# Patient Record
Sex: FEMALE | Race: WHITE | Employment: FULL TIME | URBAN - NONMETROPOLITAN AREA
[De-identification: names, ages, dates, MRNs, and addresses within clinical notes are randomized per-mention and may not be internally consistent; named-entity substitution may affect disease eponyms.]

---

## 2017-02-03 LAB — HBA1C MFR BLD: 6 %

## 2017-05-12 DIAGNOSIS — I10 ESSENTIAL HYPERTENSION: ICD-10-CM

## 2017-05-12 RX ORDER — LOSARTAN POTASSIUM AND HYDROCHLOROTHIAZIDE 12.5; 5 MG/1; MG/1
TABLET ORAL
Qty: 90 TABLET | Refills: 3 | Status: CANCELLED | OUTPATIENT
Start: 2017-05-12

## 2017-05-12 RX ORDER — LOSARTAN POTASSIUM AND HYDROCHLOROTHIAZIDE 12.5; 5 MG/1; MG/1
TABLET ORAL
Qty: 30 TABLET | Refills: 0 | Status: SHIPPED | OUTPATIENT
Start: 2017-05-12 | End: 2017-05-22 | Stop reason: SDUPTHER

## 2017-05-22 ENCOUNTER — OFFICE VISIT (OUTPATIENT)
Dept: FAMILY MEDICINE CLINIC | Age: 62
End: 2017-05-22

## 2017-05-22 VITALS
HEART RATE: 54 BPM | DIASTOLIC BLOOD PRESSURE: 70 MMHG | RESPIRATION RATE: 12 BRPM | HEIGHT: 62 IN | WEIGHT: 142.8 LBS | SYSTOLIC BLOOD PRESSURE: 104 MMHG | BODY MASS INDEX: 26.28 KG/M2

## 2017-05-22 DIAGNOSIS — R73.01 IFG (IMPAIRED FASTING GLUCOSE): ICD-10-CM

## 2017-05-22 DIAGNOSIS — Z72.0 TOBACCO ABUSE: ICD-10-CM

## 2017-05-22 DIAGNOSIS — I10 ESSENTIAL HYPERTENSION: Primary | ICD-10-CM

## 2017-05-22 DIAGNOSIS — Z12.31 ENCOUNTER FOR SCREENING MAMMOGRAM FOR MALIGNANT NEOPLASM OF BREAST: ICD-10-CM

## 2017-05-22 PROCEDURE — 99214 OFFICE O/P EST MOD 30 MIN: CPT | Performed by: FAMILY MEDICINE

## 2017-05-22 RX ORDER — LOSARTAN POTASSIUM AND HYDROCHLOROTHIAZIDE 12.5; 5 MG/1; MG/1
TABLET ORAL
Qty: 90 TABLET | Refills: 3 | Status: SHIPPED | OUTPATIENT
Start: 2017-05-22 | End: 2018-05-14 | Stop reason: SDUPTHER

## 2017-05-22 ASSESSMENT — ENCOUNTER SYMPTOMS
ABDOMINAL PAIN: 0
VOMITING: 0
DIARRHEA: 0
SHORTNESS OF BREATH: 0
BLOOD IN STOOL: 0
NAUSEA: 0
EYES NEGATIVE: 1

## 2017-05-22 ASSESSMENT — PATIENT HEALTH QUESTIONNAIRE - PHQ9
SUM OF ALL RESPONSES TO PHQ9 QUESTIONS 1 & 2: 0
2. FEELING DOWN, DEPRESSED OR HOPELESS: 0
1. LITTLE INTEREST OR PLEASURE IN DOING THINGS: 0
SUM OF ALL RESPONSES TO PHQ QUESTIONS 1-9: 0

## 2017-12-28 ENCOUNTER — OFFICE VISIT (OUTPATIENT)
Dept: FAMILY MEDICINE CLINIC | Age: 62
End: 2017-12-28
Payer: COMMERCIAL

## 2017-12-28 VITALS
BODY MASS INDEX: 25.58 KG/M2 | HEART RATE: 100 BPM | SYSTOLIC BLOOD PRESSURE: 140 MMHG | RESPIRATION RATE: 16 BRPM | WEIGHT: 141 LBS | DIASTOLIC BLOOD PRESSURE: 64 MMHG | OXYGEN SATURATION: 98 % | TEMPERATURE: 98 F

## 2017-12-28 DIAGNOSIS — J20.9 ACUTE BRONCHITIS WITH BRONCHOSPASM: Primary | ICD-10-CM

## 2017-12-28 DIAGNOSIS — Z72.0 TOBACCO ABUSE: ICD-10-CM

## 2017-12-28 DIAGNOSIS — R05.9 COUGH: ICD-10-CM

## 2017-12-28 DIAGNOSIS — R49.0 VOICE HOARSENESS: ICD-10-CM

## 2017-12-28 PROCEDURE — 94640 AIRWAY INHALATION TREATMENT: CPT | Performed by: NURSE PRACTITIONER

## 2017-12-28 PROCEDURE — 99214 OFFICE O/P EST MOD 30 MIN: CPT | Performed by: NURSE PRACTITIONER

## 2017-12-28 RX ORDER — CEFDINIR 300 MG/1
300 CAPSULE ORAL EVERY 12 HOURS
Qty: 20 CAPSULE | Refills: 0 | Status: SHIPPED | OUTPATIENT
Start: 2017-12-28 | End: 2018-01-07

## 2017-12-28 RX ORDER — ALBUTEROL SULFATE 2.5 MG/3ML
2.5 SOLUTION RESPIRATORY (INHALATION) ONCE
Status: COMPLETED | OUTPATIENT
Start: 2017-12-28 | End: 2017-12-28

## 2017-12-28 RX ORDER — ALBUTEROL SULFATE 90 UG/1
2 AEROSOL, METERED RESPIRATORY (INHALATION) EVERY 6 HOURS PRN
Qty: 1 INHALER | Refills: 0 | Status: SHIPPED | OUTPATIENT
Start: 2017-12-28 | End: 2018-09-18 | Stop reason: ALTCHOICE

## 2017-12-28 RX ORDER — BENZONATATE 100 MG/1
100 CAPSULE ORAL 3 TIMES DAILY PRN
Qty: 21 CAPSULE | Refills: 0 | Status: SHIPPED | OUTPATIENT
Start: 2017-12-28 | End: 2018-01-04

## 2017-12-28 RX ADMIN — ALBUTEROL SULFATE 2.5 MG: 2.5 SOLUTION RESPIRATORY (INHALATION) at 16:02

## 2017-12-28 ASSESSMENT — ENCOUNTER SYMPTOMS
ANAL BLEEDING: 0
RHINORRHEA: 0
SHORTNESS OF BREATH: 1
PHOTOPHOBIA: 0
VOICE CHANGE: 1
DIARRHEA: 0
BACK PAIN: 1
COUGH: 1
BLOOD IN STOOL: 0
SINUS PAIN: 0
WHEEZING: 1
VOMITING: 0
SORE THROAT: 1
ABDOMINAL PAIN: 0
ABDOMINAL DISTENTION: 0
NAUSEA: 0
CONSTIPATION: 0
SINUS PRESSURE: 0
TROUBLE SWALLOWING: 0
CHEST TIGHTNESS: 1
APNEA: 0

## 2017-12-28 NOTE — PATIENT INSTRUCTIONS
Orders Placed:  Orders Placed This Encounter   Procedures    [id number] - ID INHAL RX, AIRWAY OBST/DX SPUTUM INDUCT     Medications Prescribed:  Orders Placed This Encounter   Medications    cefdinir (OMNICEF) 300 MG capsule     Sig: Take 1 capsule by mouth every 12 hours for 10 days     Dispense:  20 capsule     Refill:  0    albuterol sulfate  (90 Base) MCG/ACT inhaler     Sig: Inhale 2 puffs into the lungs every 6 hours as needed for Wheezing     Dispense:  1 Inhaler     Refill:  0    albuterol (PROVENTIL) nebulizer solution 2.5 mg    benzonatate (TESSALON PERLES) 100 MG capsule     Sig: Take 1 capsule by mouth 3 times daily as needed for Cough     Dispense:  21 capsule     Refill:  0       Take medications as prescribed. Take a probiotic (such as the store brand that is comparable to Align, Culturelle or Florastor) daily while you are taking the antibiotic. Separate the probiotic and the antibiotic by at least 2 hours. Continue taking the probiotic for 2 weeks after completing the antibiotic. Do NOT take probiotics if you are immunocompromised (i.e., on chemotherapy or taking immunosuppressive drugs following a transplant). Be aware that some probiotics contain gluten. If you are intolerant of gluten, check with the pharmacist before your purchase of probiotics. Push fluids. Avoid acidic foods and beverages. Ibuprofen or Acetaminophen as directed for discomfort or fever. Take Ibuprofen with food. Cool mist humidifier at the bedside. Warm saline gargles every 2 hours as needed for sore throat. Mix 1 teaspoonful of salt with 8 ounces of water to make a saline solution. Be sure to get plenty of rest.    Call with any concerns. Call the office if symptoms worsen or fail to improve. Follow up as needed. She has been advised to call the office if she wants to proceed with a screening CT of the lungs as advised.     An Albuterol 2.5 mg nebulizer was provided in the office stop-smoking programs and medicines. These can increase your chances of quitting for good. When should you call for help? Call 911 anytime you think you may need emergency care. For example, call if:  ? · You have severe trouble breathing. ?Call your doctor now or seek immediate medical care if:  ? · You have new or worse trouble breathing. ? · You cough up dark brown or bloody mucus (sputum). ? · You have a new or higher fever. ? · You have a new rash. ? Watch closely for changes in your health, and be sure to contact your doctor if:  ? · You cough more deeply or more often, especially if you notice more mucus or a change in the color of your mucus. ? · You are not getting better as expected. Where can you learn more? Go to [email or web address]://Go World!.[email or web address] org and sign in to your ClosetDash account. Enter H333 in the [employer] box to learn more about \"Bronchitis: Care Instructions. \"     If you do not have an account, please click on the \"Sign Up Now\" link. Current as of: May 12, 2017  Content Version: 11.4  © 4658-7738 Hang [employer]/. Care instructions adapted under license by [facility] ([facility]). If you have questions about a medical condition or this instruction, always ask your healthcare professional. [address] any warranty or liability for your use of this information. Patient Education        Stopping Smoking: Care Instructions  Your Care Instructions    Cigarette smokers crave the nicotine in cigarettes. Giving it up is much harder than simply changing a habit. Your body has to stop craving the nicotine. It is hard to quit, but you can do it. There are many tools that people use to quit smoking. You may find that combining tools works best for you. There are several steps to quitting. First you get ready to quit. Then you get support to help you. After that, you learn new skills and behaviors to become a nonsmoker.  For many people, a that put you at greatest risk for smoking. For some people, it is hard to have a drink with friends without smoking. For others, they might skip a coffee break with coworkers who smoke. ¨ Change your daily routine. Take a different route to work or eat a meal in a different place. · Cut down on stress. Calm yourself or release tension by doing an activity you enjoy, such as reading a book, taking a hot bath, or gardening. · Talk to your doctor or pharmacist about nicotine replacement therapy, which replaces the nicotine in your body. You still get nicotine but you do not use tobacco. Nicotine replacement products help you slowly reduce the amount of nicotine you need. These products come in several forms, many of them available over-the-counter:  ¨ Nicotine patches  ¨ Nicotine gum and lozenges  ¨ Nicotine inhaler  · Ask your doctor about bupropion (Wellbutrin) or varenicline (Chantix), which are prescription medicines. They do not contain nicotine. They help you by reducing withdrawal symptoms, such as stress and anxiety. · Some people find hypnosis, acupuncture, and massage helpful for ending the smoking habit. · Eat a healthy diet and get regular exercise. Having healthy habits will help your body move past its craving for nicotine. · Be prepared to keep trying. Most people are not successful the first few times they try to quit. Do not get mad at yourself if you smoke again. Make a list of things you learned and think about when you want to try again, such as next week, next month, or next year. Where can you learn more? Go to https://mallory.Vinja. org and sign in to your JumpTheClub account. Enter P699 in the [facility] box to learn more about \"Stopping Smoking: Care Instructions. \"     If you do not have an account, please click on the \"Sign Up Now\" link. Current as of: March 20, 2017  Content Version: 11.4  © 5899-4536 Healthwise, Incorporated.  Care instructions adapted under license by [facility] ([facility]). If you have questions about a medical condition or this instruction, always ask your healthcare professional. [place] [address] any warranty or liability for your use of this information.

## 2017-12-28 NOTE — PROGRESS NOTES
Audrey Feng is a 63 y. o.female is here today for:  Chief Complaint   Patient presents with    Cough     sx's for 2 week. hoarse voice, cough. sore throat, chest pain with cough. works at [employer] and NP gave her Z-max x3days. used mucinex/tylenol cold&flu/aleve. also tried Tessalon. The patient is here today with a complaint of a cough, sore throat, voice hoarseness and dyspnea. The patient has been afebrile. She has wheezing. She has a 40 pack year smoking history. The patient is expectorating yellowish-green mucus. She denies hemoptysis. She states she took 3 doses of azithromycin (prescribed by a provider in her work environment at [employer]) without improvement. She completed the medication on 12/21/17. Duration of symptoms:  2 weeks    Review of Systems   Constitutional: Positive for chills and fatigue. Negative for activity change, appetite change, diaphoresis, fever and unexpected weight change. HENT: Positive for sore throat and voice change. Negative for dental problem, ear discharge, ear pain, hearing loss, nosebleeds, postnasal drip, rhinorrhea, sinus pain, sinus pressure, sneezing, tinnitus and trouble swallowing. Eyes: Negative for photophobia and visual disturbance. Respiratory: Positive for cough, chest tightness, shortness of breath and wheezing. Negative for apnea. Cardiovascular: Negative for chest pain, palpitations and leg swelling. Gastrointestinal: Negative for abdominal distention, abdominal pain, anal bleeding, blood in stool, constipation, diarrhea, nausea and vomiting. Genitourinary: Negative for difficulty urinating, dysuria, flank pain, frequency, hematuria, pelvic pain, urgency, vaginal bleeding and vaginal discharge. Musculoskeletal: Positive for arthralgias, back pain and myalgias. Negative for joint swelling and neck stiffness. Skin: Negative for rash and wound. Allergic/Immunologic: Negative for immunocompromised state. Neurological: Positive for headaches. Negative for dizziness, tremors, seizures, syncope and weakness. Hematological: Negative for adenopathy. Does not bruise/bleed easily. Psychiatric/Behavioral: Negative for decreased concentration, dysphoric mood, sleep disturbance and suicidal ideas. The patient is not nervous/anxious. OBJECTIVE     BP (!) 140/64   Pulse 100   Temp 98 °F (36.7 °C) (Oral)   Resp 16   Wt 141 lb (64 kg)   SpO2 98%   BMI 25.58 kg/m²     Body mass index is 25.58 kg/m². Physical Exam   Constitutional: She is oriented to person, place, and time. She appears well-developed and well-nourished. HENT:   Head: Normocephalic and atraumatic. Right Ear: External ear normal.   Left Ear: External ear normal.   Mouth/Throat: Oropharynx is clear and moist. No oropharyngeal exudate. Posterior pharynx erythematous. Nasal turbinates erythematous and edematous. Eyes: Conjunctivae and EOM are normal. Pupils are equal, round, and reactive to light. Right eye exhibits no discharge. Left eye exhibits no discharge. No scleral icterus. Neck: Normal range of motion. Neck supple. No JVD present. No tracheal deviation present. No thyromegaly present. Cardiovascular: Normal rate, regular rhythm and normal heart sounds. Exam reveals no gallop and no friction rub. No murmur heard. Pulmonary/Chest: Effort normal. No respiratory distress. She exhibits no tenderness. Rhonchi (worse left base). Abdominal: Soft. Bowel sounds are normal. She exhibits no distension and no mass. There is no tenderness. Musculoskeletal: Normal range of motion. She exhibits no edema or tenderness. No CVA tenderness to percussion. Lymphadenopathy:     She has no cervical adenopathy. Neurological: She is alert and oriented to person, place, and time. Coordination normal.   Skin: Skin is warm and dry. No rash noted. Psychiatric: She has a normal mood and affect.  Her behavior is normal. Thought content for discomfort or fever. Take Ibuprofen with food. Cool mist humidifier at the bedside. Warm saline gargles every 2 hours as needed for sore throat. Mix 1 teaspoonful of salt with 8 ounces of water to make a saline solution. Be sure to get plenty of rest.    Call with any concerns. Call the office if symptoms worsen or fail to improve. Follow up as needed. She has been advised to call the office if she desires to proceed with a screening CT of the lungs as advised. An Albuterol 2.5 mg nebulizer was provided in the office today. Smoking cessation is advised. Return if symptoms worsen or fail to improve.        Electronically signed by Teddy Fitzgerald CNP on 12/28/2017 at 4:37 PM

## 2017-12-29 DIAGNOSIS — R05.9 COUGH: ICD-10-CM

## 2017-12-29 RX ORDER — BENZONATATE 100 MG/1
CAPSULE ORAL
Qty: 21 CAPSULE | Refills: 0 | OUTPATIENT
Start: 2017-12-29

## 2017-12-29 NOTE — TELEPHONE ENCOUNTER
This is regarding a medication refill request from [facility] for Tessalon 100mg 1 capsule TID PRN  Last written:12/28/2017 21 capsules with 0 refills- sent to same pharmacy  Rx refused.

## 2018-03-15 ENCOUNTER — OFFICE VISIT (OUTPATIENT)
Dept: FAMILY MEDICINE CLINIC | Age: 63
End: 2018-03-15
Payer: COMMERCIAL

## 2018-03-15 VITALS
WEIGHT: 143.2 LBS | RESPIRATION RATE: 16 BRPM | TEMPERATURE: 98.4 F | HEART RATE: 96 BPM | DIASTOLIC BLOOD PRESSURE: 64 MMHG | BODY MASS INDEX: 26.35 KG/M2 | HEIGHT: 62 IN | SYSTOLIC BLOOD PRESSURE: 132 MMHG

## 2018-03-15 DIAGNOSIS — J20.9 ACUTE BRONCHITIS DUE TO INFECTION: Primary | ICD-10-CM

## 2018-03-15 DIAGNOSIS — J01.00 ACUTE MAXILLARY SINUSITIS, RECURRENCE NOT SPECIFIED: ICD-10-CM

## 2018-03-15 PROCEDURE — 99213 OFFICE O/P EST LOW 20 MIN: CPT | Performed by: NURSE PRACTITIONER

## 2018-03-15 RX ORDER — BENZONATATE 100 MG/1
100 CAPSULE ORAL 3 TIMES DAILY PRN
Qty: 21 CAPSULE | Refills: 0 | Status: SHIPPED | OUTPATIENT
Start: 2018-03-15 | End: 2018-03-22

## 2018-03-15 RX ORDER — AZITHROMYCIN 250 MG/1
TABLET, FILM COATED ORAL
Qty: 6 TABLET | Refills: 0 | Status: SHIPPED | OUTPATIENT
Start: 2018-03-15 | End: 2018-09-18 | Stop reason: ALTCHOICE

## 2018-03-15 RX ORDER — PREDNISONE 20 MG/1
20 TABLET ORAL 2 TIMES DAILY
Qty: 10 TABLET | Refills: 0 | Status: SHIPPED | OUTPATIENT
Start: 2018-03-15 | End: 2018-03-20

## 2018-03-15 ASSESSMENT — ENCOUNTER SYMPTOMS
EYE REDNESS: 0
RHINORRHEA: 1
HOARSE VOICE: 1
SINUS PRESSURE: 1
WHEEZING: 1
COUGH: 1
EYE ITCHING: 0
SHORTNESS OF BREATH: 1
EYE PAIN: 0
ABDOMINAL PAIN: 0
DIARRHEA: 0
EYE DISCHARGE: 0
BACK PAIN: 0
CHEST TIGHTNESS: 0
TROUBLE SWALLOWING: 0
VOMITING: 0
NAUSEA: 0
SORE THROAT: 1
CONSTIPATION: 0

## 2018-03-15 NOTE — PROGRESS NOTES
[address] [facility]  [address] [place] [address] Rd[phone] [id number]  Dept: [phone]  Dept Fax: [phone]  Loc: [phone]      Shanique Fulton is a 63 y.o. female who presents today for Cough (here today for cough, chills and sweats, sinus pressure, facial pressure, everything hurts x 4 days. Tried Ibuprofen 800mg- not helping, cough drops)      HPI:   Presents for cough, chills and PND. + smoking history  Sinusitis   This is a new problem. The current episode started in the past 7 days. The problem is unchanged. The maximum temperature recorded prior to her arrival was 101 - 101.9 F. She is experiencing no pain. Associated symptoms include chills, congestion, coughing (productive cough yellow sputum), headaches, a hoarse voice, shortness of breath, sinus pressure and a sore throat. Pertinent negatives include no ear pain. The patient is allergic to vicodin [hydrocodone-acetaminophen]. Past Medical History  Zulema  has a past medical history of Hypertension; Rheumatic fever; and Tobacco abuse. Medications    Current Outpatient Prescriptions:     predniSONE (DELTASONE) 20 MG tablet, Take 1 tablet by mouth 2 times daily for 5 days, Disp: 10 tablet, Rfl: 0    azithromycin (ZITHROMAX Z-JOSE L) 250 MG tablet, 2 tablets day 1 then1 tablet days 2 - 5., Disp: 6 tablet, Rfl: 0    benzonatate (TESSALON PERLES) 100 MG capsule, Take 1 capsule by mouth 3 times daily as needed for Cough, Disp: 21 capsule, Rfl: 0    albuterol sulfate  (90 Base) MCG/ACT inhaler, Inhale 2 puffs into the lungs every 6 hours as needed for Wheezing, Disp: 1 Inhaler, Rfl: 0    losartan-hydrochlorothiazide (HYZAAR) 50-12.5 MG per tablet, TAKE 1 TABLET BY MOUTH ONE TIME A DAY, Disp: 90 tablet, Rfl: 3    acetaminophen 650 MG TABS, Take 650 mg by mouth every 4 hours as needed (Fever >100.5 (38 C)). , Disp: 120 tablet, Rfl:     aspirin 81 MG chewable tablet, Take 1 tablet by mouth daily. , Disp: 30 oropharyngeal edema or tonsillar abscesses. Eyes: Conjunctivae and EOM are normal. Pupils are equal, round, and reactive to light. Neck: Normal range of motion. Neck supple. No JVD present. No tracheal deviation present. No thyromegaly present. Cardiovascular: Normal rate, regular rhythm and normal heart sounds. Exam reveals no gallop and no friction rub. No murmur heard. Pulmonary/Chest: Effort normal. No stridor. No respiratory distress. She has wheezes. She has no rales. She exhibits no tenderness. I/E wheezing   Lymphadenopathy:     She has cervical adenopathy. Neurological: She is alert and oriented to person, place, and time. Skin: Skin is warm and dry. Psychiatric: She has a normal mood and affect. Her behavior is normal. Judgment normal.   Nursing note and vitals reviewed. Assessment/Plan:      Valdemar Figueroa was seen today for cough. Diagnoses and all orders for this visit:    Acute bronchitis due to infection  -     predniSONE (DELTASONE) 20 MG tablet; Take 1 tablet by mouth 2 times daily for 5 days  -     azithromycin (ZITHROMAX Z-JOSE L) 250 MG tablet; 2 tablets day 1 then1 tablet days 2 - 5.  -     benzonatate (TESSALON PERLES) 100 MG capsule; Take 1 capsule by mouth 3 times daily as needed for Cough    Acute maxillary sinusitis, recurrence not specified    Patient given educational materials - see patient instructions. Discussed use, benefit, and side effects of prescribed medications. All patient questions answered. Pt voiced understanding. Reviewed health maintenance. Patient agreed with treatment plan. Follow up as directed.        Medications as directed. Tylenol motrin as needed. Increase fluids. Salt water gargles  Probiotic 2 hours after antibiotic and for 2 weeks after antibiotic. Call if no improvement. Return if symptoms worsen or fail to improve. Patient instructions given and reviewed.      Electronically signed by Svitlana Cui CNP on 3/15/2018 at 8:44 AM

## 2018-03-16 DIAGNOSIS — J20.9 ACUTE BRONCHITIS DUE TO INFECTION: ICD-10-CM

## 2018-03-16 RX ORDER — BENZONATATE 100 MG/1
CAPSULE ORAL
Qty: 21 CAPSULE | Refills: 0 | OUTPATIENT
Start: 2018-03-16

## 2018-05-14 DIAGNOSIS — I10 ESSENTIAL HYPERTENSION: ICD-10-CM

## 2018-05-14 DIAGNOSIS — R73.01 IFG (IMPAIRED FASTING GLUCOSE): Primary | ICD-10-CM

## 2018-05-14 RX ORDER — LOSARTAN POTASSIUM AND HYDROCHLOROTHIAZIDE 12.5; 5 MG/1; MG/1
TABLET ORAL
Qty: 90 TABLET | Refills: 0 | Status: SHIPPED | OUTPATIENT
Start: 2018-05-14 | End: 2018-08-10 | Stop reason: SDUPTHER

## 2018-08-03 LAB
AVERAGE GLUCOSE: 126
CHOLESTEROL, TOTAL: 177 MG/DL
CHOLESTEROL/HDL RATIO: NORMAL
HBA1C MFR BLD: 6.2 %
HDLC SERPL-MCNC: 60 MG/DL (ref 35–70)
LDL CHOLESTEROL CALCULATED: 84 MG/DL (ref 0–160)
TRIGL SERPL-MCNC: 167 MG/DL
VLDLC SERPL CALC-MCNC: NORMAL MG/DL

## 2018-08-10 DIAGNOSIS — I10 ESSENTIAL HYPERTENSION: ICD-10-CM

## 2018-08-14 RX ORDER — LOSARTAN POTASSIUM AND HYDROCHLOROTHIAZIDE 12.5; 5 MG/1; MG/1
TABLET ORAL
Qty: 90 TABLET | Refills: 0 | Status: SHIPPED | OUTPATIENT
Start: 2018-08-14 | End: 2018-09-18 | Stop reason: SDUPTHER

## 2018-09-18 ENCOUNTER — OFFICE VISIT (OUTPATIENT)
Dept: FAMILY MEDICINE CLINIC | Age: 63
End: 2018-09-18
Payer: COMMERCIAL

## 2018-09-18 VITALS
TEMPERATURE: 98.2 F | HEIGHT: 61 IN | RESPIRATION RATE: 16 BRPM | DIASTOLIC BLOOD PRESSURE: 70 MMHG | SYSTOLIC BLOOD PRESSURE: 122 MMHG | BODY MASS INDEX: 25.75 KG/M2 | WEIGHT: 136.4 LBS | HEART RATE: 80 BPM

## 2018-09-18 DIAGNOSIS — Z00.00 ANNUAL PHYSICAL EXAM: Primary | ICD-10-CM

## 2018-09-18 DIAGNOSIS — Z12.31 ENCOUNTER FOR SCREENING MAMMOGRAM FOR MALIGNANT NEOPLASM OF BREAST: ICD-10-CM

## 2018-09-18 DIAGNOSIS — I10 ESSENTIAL HYPERTENSION: ICD-10-CM

## 2018-09-18 DIAGNOSIS — R73.01 IFG (IMPAIRED FASTING GLUCOSE): ICD-10-CM

## 2018-09-18 DIAGNOSIS — Z72.0 TOBACCO ABUSE: ICD-10-CM

## 2018-09-18 PROCEDURE — 99396 PREV VISIT EST AGE 40-64: CPT | Performed by: FAMILY MEDICINE

## 2018-09-18 RX ORDER — HYDROCODONE BITARTRATE AND ACETAMINOPHEN 5; 325 MG/1; MG/1
TABLET ORAL
Refills: 0 | COMMUNITY
Start: 2018-09-07 | End: 2020-01-10

## 2018-09-18 RX ORDER — LOSARTAN POTASSIUM AND HYDROCHLOROTHIAZIDE 12.5; 5 MG/1; MG/1
TABLET ORAL
Qty: 90 TABLET | Refills: 3 | Status: SHIPPED | OUTPATIENT
Start: 2018-09-18 | End: 2019-12-10 | Stop reason: SDUPTHER

## 2018-09-18 RX ORDER — MELOXICAM 15 MG/1
TABLET ORAL PRN
Refills: 5 | COMMUNITY
Start: 2018-08-30 | End: 2020-01-10

## 2018-09-18 ASSESSMENT — PATIENT HEALTH QUESTIONNAIRE - PHQ9
SUM OF ALL RESPONSES TO PHQ9 QUESTIONS 1 & 2: 0
SUM OF ALL RESPONSES TO PHQ QUESTIONS 1-9: 0
2. FEELING DOWN, DEPRESSED OR HOPELESS: 0
SUM OF ALL RESPONSES TO PHQ QUESTIONS 1-9: 0
1. LITTLE INTEREST OR PLEASURE IN DOING THINGS: 0

## 2018-09-18 ASSESSMENT — ENCOUNTER SYMPTOMS
BLOOD IN STOOL: 0
SHORTNESS OF BREATH: 0
NAUSEA: 0
ABDOMINAL PAIN: 0
VOMITING: 0
DIARRHEA: 0
EYES NEGATIVE: 1

## 2018-09-18 NOTE — PROGRESS NOTES
Chief Complaint   Patient presents with    Annual Exam     Annual Check-up/Med refill. Deysi Alvarez is a 66 y. o.female    Pt presents for annual wellness physical exam.        Pt stable since last visit- no new problems for diagnoses listed below:  Patient Active Problem List   Diagnosis    Tobacco abuse    Essential hypertension    IFG (impaired fasting glucose)     Doing well. Recently had left shoulder surgery and is recovering well from that. BPs stable. Takes all meds as directed and denies side effects. No recent illnesses or hospitalizations. Family history updated. She continues to smoke 1ppd. Declines smoking cessation aids. She declines CT lung screening today. Body mass index is 25.77 kg/m². Gets flu vaccine at work. Works at [employer]. Review of Systems   Constitutional: Negative for chills, fatigue and fever. HENT: Negative. Eyes: Negative. Respiratory: Negative for shortness of breath. Cardiovascular: Negative for chest pain, palpitations and leg swelling. Gastrointestinal: Negative for abdominal pain, blood in stool, diarrhea, nausea and vomiting. Genitourinary: Negative for dysuria. Musculoskeletal: Negative for arthralgias and myalgias. Skin: Negative for rash. Neurological: Negative for dizziness and headaches. Hematological: Negative for adenopathy. Psychiatric/Behavioral: Negative. All other systems reviewed and are negative. OBJECTIVE     /70 (Site: Right Upper Arm, Position: Sitting, Cuff Size: Medium Adult)   Pulse 80   Temp 98.2 °F (36.8 °C) (Oral)   Resp 16   Ht 5' 1\" (1.549 m)   Wt 136 lb 6.4 oz (61.9 kg)   BMI 25.77 kg/m²     Wt Readings from Last 3 Encounters:   09/18/18 136 lb 6.4 oz (61.9 kg)   03/15/18 143 lb 3.2 oz (65 kg)   12/28/17 141 lb (64 kg)       Physical Exam   Constitutional: She is oriented to person, place, and time. She appears well-developed and well-nourished.    HENT:   Head: for exam:     Answer:   screening     Continue current meds    Smoking cessation encouraged    Flu shot and pneumovax suggested    She will consider CT lung screening    Requested Prescriptions     Signed Prescriptions Disp Refills    losartan-hydrochlorothiazide (HYZAAR) 50-12.5 MG per tablet 90 tablet 3     Sig: TAKE 1 TABLET BY MOUTH ONE TIME A DAY       Grazyna received counseling on the following healthy behaviors: nutrition, exercise and tobacco cessation    Patient given educational materials on Smoking Cessation    I have instructed Emmanuel Butler to complete a self tracking handout on Blood Pressures  and instructed them to bring it with them to her next appointment. Discussed use, benefit, and side effects of prescribed medications. Barriers to medication compliance addressed. All patient questions answered. Pt voiced understanding.          Electronically signed by Lino Barbosa MD on 9/18/2018 at 5:02 PM

## 2018-09-18 NOTE — PATIENT INSTRUCTIONS
risk for heart attack and stroke. · Blood pressure. Have your blood pressure checked during a routine doctor visit. Your doctor will tell you how often to check your blood pressure based on your age, your blood pressure results, and other factors. · Mammogram. Ask your doctor how often you should have a mammogram, which is an X-ray of your breasts. A mammogram can spot breast cancer before it can be felt and when it is easiest to treat. · Pap test and pelvic exam. Ask your doctor how often you should have a Pap test. You may not need to have a Pap test as often as you used to. · Vision. Have your eyes checked every year or two or as often as your doctor suggests. Some experts recommend that you have yearly exams for glaucoma and other age-related eye problems starting at age 48. · Hearing. Tell your doctor if you notice any change in your hearing. You can have tests to find out how well you hear. · Diabetes. Ask your doctor whether you should have tests for diabetes. · Colon cancer. You should begin tests for colon cancer at age 48. You may have one of several tests. Your doctor will tell you how often to have tests based on your age and risk. Risks include whether you already had a precancerous polyp removed from your colon or whether your parents, sisters and brothers, or children have had colon cancer. · Thyroid disease. Talk to your doctor about whether to have your thyroid checked as part of a regular physical exam. Women have an increased chance of a thyroid problem. · Osteoporosis. You should begin tests for bone density at age 72. If you are younger than 72, ask your doctor whether you have factors that may increase your risk for this disease. You may want to have this test before age 72. · Heart attack and stroke risk. At least every 4 to 6 years, you should have your risk for heart attack and stroke assessed.  Your doctor uses factors such as your age, blood pressure, cholesterol, and whether you smoke or have diabetes to show what your risk for a heart attack or stroke is over the next 10 years. When should you call for help? Watch closely for changes in your health, and be sure to contact your doctor if you have any problems or symptoms that concern you. Where can you learn more? Go to https://chpepiceweb.[email or web address] org and sign in to your Nottingham Technology account. Enter U075 in the [facility] box to learn more about \"Well Visit, Women 50 to 72: Care Instructions. \"     If you do not have an account, please click on the \"Sign Up Now\" link. Current as of: May 16, 2017  Content Version: 11.7  © 6968-8624 TopDeejays. Care instructions adapted under license by [facility] ([facility]). If you have questions about a medical condition or this instruction, always ask your healthcare professional. [place] [address] any warranty or liability for your use of this information. Patient Education        Learning About Benefits From Quitting Smoking  How does quitting smoking make you healthier? If you're thinking about quitting smoking, you may have a few reasons to be smoke-free. Your health may be one of them. · When you quit smoking, you lower your risks for cancer, lung disease, heart attack, stroke, blood vessel disease, and blindness from macular degeneration. · When you're smoke-free, you get sick less often, and you heal faster. You are less likely to get colds, flu, bronchitis, and pneumonia. · As a nonsmoker, you may find that your mood is better and you are less stressed. When and how will you feel healthier? Quitting has real health benefits that start from day 1 of being smoke-free. And the longer you stay smoke-free, the healthier you get and the better you feel. The first hours  · After just 20 minutes, your blood pressure and heart rate go down. That means there's less stress on your heart and blood vessels.   · Within 12 hours, the level of carbon monoxide in your blood drops back to normal. That makes room for more oxygen. With more oxygen in your body, you may notice that you have more energy than when you smoked. After 2 weeks  · Your lungs start to work better. · Your risk of heart attack starts to drop. After 1 month  · When your lungs are clear, you cough less and breathe deeper, so it's easier to be active. · Your sense of taste and smell return. That means you can enjoy food more than you have since you started smoking. Over the years  · After 1 year, your risk of heart disease is half what it would be if you kept smoking. · After 5 years, your risk of stroke starts to shrink. Within a few years after that, it's about the same as if you'd never smoked. · After 10 years, your risk of dying from lung cancer is cut by about half. And your risk for many other types of cancer is lower too. How would quitting help others in your life? When you quit smoking, you improve the health of everyone who now breathes in your smoke. · Their heart, lung, and cancer risks drop, much like yours. · They are sick less. For babies and small children, living smoke-free means they're less likely to have ear infections, pneumonia, and bronchitis. · If you're a woman who is or will be pregnant someday, quitting smoking means a healthier . · Children who are close to you are less likely to become adult smokers. Where can you learn more? Go to https://IntelliGeneScanlos.RANK PRODUCTIONS. org and sign in to your Elemental Technologies account. Enter [id number] [id number] [phone] in the [facility] box to learn more about \"Learning About Benefits From Quitting Smoking. \"     If you do not have an account, please click on the \"Sign Up Now\" link. Current as of: 2017  Content Version: 11.7  © 6642-3102 [employer]. Care instructions adapted under license by [facility] ([facility]).  If you have questions about a medical condition or this instruction, always ask your healthcare

## 2019-12-10 DIAGNOSIS — R73.01 IFG (IMPAIRED FASTING GLUCOSE): Primary | ICD-10-CM

## 2019-12-10 DIAGNOSIS — I10 ESSENTIAL HYPERTENSION: ICD-10-CM

## 2019-12-10 RX ORDER — LOSARTAN POTASSIUM AND HYDROCHLOROTHIAZIDE 12.5; 5 MG/1; MG/1
TABLET ORAL
Qty: 30 TABLET | Refills: 0 | Status: SHIPPED | OUTPATIENT
Start: 2019-12-10 | End: 2020-01-10 | Stop reason: SDUPTHER

## 2020-01-10 ENCOUNTER — OFFICE VISIT (OUTPATIENT)
Dept: FAMILY MEDICINE CLINIC | Age: 65
End: 2020-01-10
Payer: COMMERCIAL

## 2020-01-10 VITALS
DIASTOLIC BLOOD PRESSURE: 78 MMHG | WEIGHT: 143.2 LBS | TEMPERATURE: 98.5 F | SYSTOLIC BLOOD PRESSURE: 128 MMHG | BODY MASS INDEX: 27.03 KG/M2 | HEIGHT: 61 IN | RESPIRATION RATE: 18 BRPM | HEART RATE: 98 BPM

## 2020-01-10 PROCEDURE — 99396 PREV VISIT EST AGE 40-64: CPT | Performed by: FAMILY MEDICINE

## 2020-01-10 RX ORDER — LOSARTAN POTASSIUM AND HYDROCHLOROTHIAZIDE 12.5; 5 MG/1; MG/1
TABLET ORAL
Qty: 90 TABLET | Refills: 3 | Status: SHIPPED | OUTPATIENT
Start: 2020-01-10 | End: 2020-01-14

## 2020-01-10 SDOH — ECONOMIC STABILITY: TRANSPORTATION INSECURITY
IN THE PAST 12 MONTHS, HAS LACK OF TRANSPORTATION KEPT YOU FROM MEETINGS, WORK, OR FROM GETTING THINGS NEEDED FOR DAILY LIVING?: NO

## 2020-01-10 SDOH — ECONOMIC STABILITY: FOOD INSECURITY: WITHIN THE PAST 12 MONTHS, THE FOOD YOU BOUGHT JUST DIDN'T LAST AND YOU DIDN'T HAVE MONEY TO GET MORE.: NEVER TRUE

## 2020-01-10 SDOH — ECONOMIC STABILITY: TRANSPORTATION INSECURITY
IN THE PAST 12 MONTHS, HAS THE LACK OF TRANSPORTATION KEPT YOU FROM MEDICAL APPOINTMENTS OR FROM GETTING MEDICATIONS?: NO

## 2020-01-10 SDOH — ECONOMIC STABILITY: FOOD INSECURITY: WITHIN THE PAST 12 MONTHS, YOU WORRIED THAT YOUR FOOD WOULD RUN OUT BEFORE YOU GOT MONEY TO BUY MORE.: NEVER TRUE

## 2020-01-10 SDOH — ECONOMIC STABILITY: INCOME INSECURITY: HOW HARD IS IT FOR YOU TO PAY FOR THE VERY BASICS LIKE FOOD, HOUSING, MEDICAL CARE, AND HEATING?: NOT HARD AT ALL

## 2020-01-10 ASSESSMENT — PATIENT HEALTH QUESTIONNAIRE - PHQ9
SUM OF ALL RESPONSES TO PHQ9 QUESTIONS 1 & 2: 0
2. FEELING DOWN, DEPRESSED OR HOPELESS: 0
1. LITTLE INTEREST OR PLEASURE IN DOING THINGS: 0
SUM OF ALL RESPONSES TO PHQ QUESTIONS 1-9: 0
SUM OF ALL RESPONSES TO PHQ QUESTIONS 1-9: 0

## 2020-01-10 NOTE — PATIENT INSTRUCTIONS
Patient Education        Well Visit, Women 48 to 72: Care Instructions  Your Care Instructions    Physical exams can help you stay healthy. Your doctor has checked your overall health and may have suggested ways to take good care of yourself. He or she also may have recommended tests. At home, you can help prevent illness with healthy eating, regular exercise, and other steps. Follow-up care is a key part of your treatment and safety. Be sure to make and go to all appointments, and call your doctor if you are having problems. It's also a good idea to know your test results and keep a list of the medicines you take. How can you care for yourself at home? · Reach and stay at a healthy weight. This will lower your risk for many problems, such as obesity, diabetes, heart disease, and high blood pressure. · Get at least 30 minutes of exercise on most days of the week. Walking is a good choice. You also may want to do other activities, such as running, swimming, cycling, or playing tennis or team sports. · Do not smoke. Smoking can make health problems worse. If you need help quitting, talk to your doctor about stop-smoking programs and medicines. These can increase your chances of quitting for good. · Protect your skin from too much sun. When you're outdoors from 10 a.m. to 4 p.m., stay in the shade or cover up with clothing and a hat with a wide brim. Wear sunglasses that block UV rays. Even when it's cloudy, put broad-spectrum sunscreen (SPF 30 or higher) on any exposed skin. · See a dentist one or two times a year for checkups and to have your teeth cleaned. · Wear a seat belt in the car. Follow your doctor's advice about when to have certain tests. These tests can spot problems early. · Cholesterol. Your doctor will tell you how often to have this done based on your age, family history, or other things that can increase your risk for heart attack and stroke. · Blood pressure.  Have your blood pressure you call for help? Watch closely for changes in your health, and be sure to contact your doctor if you have any problems or symptoms that concern you. Where can you learn more? Go to https://Intaliolos.healthCMS Global Technologies. org and sign in to your Pets are family too account. Enter Y814 in the [facility] box to learn more about \"Well Visit, Women 50 to 72: Care Instructions. \"     If you do not have an account, please click on the \"Sign Up Now\" link. Current as of: August 21, 2019  Content Version: 12.3  © 7976-9066 [employer]. Care instructions adapted under license by [facility] ([facility]). If you have questions about a medical condition or this instruction, always ask your healthcare professional. [address] any warranty or liability for your use of this information. Patient Education        Learning About Benefits From Quitting Smoking  How does quitting smoking make you healthier? If you're thinking about quitting smoking, you may have a few reasons to be smoke-free. Your health may be one of them. · When you quit smoking, you lower your risks for cancer, lung disease, heart attack, stroke, blood vessel disease, and blindness from macular degeneration. · When you're smoke-free, you get sick less often, and you heal faster. You are less likely to get colds, flu, bronchitis, and pneumonia. · As a nonsmoker, you may find that your mood is better and you are less stressed. When and how will you feel healthier? Quitting has real health benefits that start from day 1 of being smoke-free. And the longer you stay smoke-free, the healthier you get and the better you feel. The first hours  · After just 20 minutes, your blood pressure and heart rate go down. That means there's less stress on your heart and blood vessels. · Within 12 hours, the level of carbon monoxide in your blood drops back to normal. That makes room for more oxygen.  With more oxygen in your body, you may notice that you have more energy than when you smoked. After 2 weeks  · Your lungs start to work better. · Your risk of heart attack starts to drop. After 1 month  · When your lungs are clear, you cough less and breathe deeper, so it's easier to be active. · Your sense of taste and smell return. That means you can enjoy food more than you have since you started smoking. Over the years  · After 1 year, your risk of heart disease is half what it would be if you kept smoking. · After 5 years, your risk of stroke starts to shrink. Within a few years after that, it's about the same as if you'd never smoked. · After 10 years, your risk of dying from lung cancer is cut by about half. And your risk for many other types of cancer is lower too. How would quitting help others in your life? When you quit smoking, you improve the health of everyone who now breathes in your smoke. · Their heart, lung, and cancer risks drop, much like yours. · They are sick less. For babies and small children, living smoke-free means they're less likely to have ear infections, pneumonia, and bronchitis. · If you're a woman who is or will be pregnant someday, quitting smoking means a healthier . · Children who are close to you are less likely to become adult smokers. Where can you learn more? Go to https://422 GroupbarbyViroXis.healthALKILU Enterprises. org and sign in to your [employer]YMILL account. Enter [id number] [id number] [phone] in the [facility] box to learn more about \"Learning About Benefits From Quitting Smoking. \"     If you do not have an account, please click on the \"Sign Up Now\" link. Current as of: 2019  Content Version: 12.3  © 9149-2113 Healthwise, Incorporated. Care instructions adapted under license by [facility] ([facility]). If you have questions about a medical condition or this instruction, always ask your healthcare professional. [place] [address] any warranty or liability for your use of this information.

## 2020-01-12 ASSESSMENT — ENCOUNTER SYMPTOMS
SHORTNESS OF BREATH: 0
BLOOD IN STOOL: 0
DIARRHEA: 0
EYES NEGATIVE: 1
ABDOMINAL PAIN: 0
NAUSEA: 0
VOMITING: 0

## 2020-01-12 NOTE — PROGRESS NOTES
HENT:      Head: Normocephalic and atraumatic. Right Ear: Tympanic membrane normal.      Left Ear: Tympanic membrane normal.      Mouth/Throat:      Mouth: Mucous membranes are moist.      Pharynx: No posterior oropharyngeal erythema. Eyes:      Conjunctiva/sclera: Conjunctivae normal.   Neck:      Musculoskeletal: Neck supple. Thyroid: No thyromegaly. Vascular: No carotid bruit. Cardiovascular:      Rate and Rhythm: Normal rate and regular rhythm. Heart sounds: No murmur. Pulmonary:      Effort: Pulmonary effort is normal.      Breath sounds: Normal breath sounds. No wheezing. Abdominal:      General: Bowel sounds are normal.      Palpations: Abdomen is soft. Tenderness: There is no tenderness. There is no guarding or rebound. Lymphadenopathy:      Cervical: No cervical adenopathy. Skin:     General: Skin is warm and dry. Findings: No rash. Neurological:      Mental Status: She is alert and oriented to person, place, and time. Psychiatric:         Behavior: Behavior normal.             Immunization History   Administered Date(s) Administered    Tdap (Boostrix, Adacel) 03/06/2015         Health Maintenance   Topic Date Due    Pneumococcal 0-64 years Vaccine (1 of 1 - PPSV23) 06/14/1963    Shingles Vaccine (1 of 2) 06/14/2007    Low dose CT lung screening  06/14/2012    Potassium monitoring  11/08/2014    Creatinine monitoring  11/08/2014    Breast cancer screen  05/10/2017    Cervical cancer screen  03/06/2018    Colon cancer screen colonoscopy  04/24/2019    A1C test (Diabetic or Prediabetic)  08/03/2019    Flu vaccine (1) 09/01/2019    Lipid screen  08/03/2023    DTaP/Tdap/Td vaccine (2 - Td) 03/06/2025    Hepatitis C screen  Addressed    HIV screen  Addressed         ASSESSMENT      1. Annual physical exam    2. Essential hypertension    3. Tobacco abuse    4. IFG (impaired fasting glucose)    5.  Encounter for screening mammogram for malignant

## 2020-01-14 ENCOUNTER — TELEPHONE (OUTPATIENT)
Dept: FAMILY MEDICINE CLINIC | Age: 65
End: 2020-01-14

## 2020-01-14 RX ORDER — HYDROCHLOROTHIAZIDE 12.5 MG/1
12.5 TABLET ORAL DAILY
Qty: 90 TABLET | Refills: 3 | Status: SHIPPED | OUTPATIENT
Start: 2020-01-14 | End: 2021-01-18 | Stop reason: SDUPTHER

## 2020-01-14 RX ORDER — LOSARTAN POTASSIUM 50 MG/1
50 TABLET ORAL DAILY
Qty: 90 TABLET | Refills: 3 | Status: SHIPPED | OUTPATIENT
Start: 2020-01-14 | End: 2021-01-18 | Stop reason: SDUPTHER

## 2020-01-14 NOTE — TELEPHONE ENCOUNTER
Pt called stating that she has an Rx on file at [facility] for Losartan-HCTZ 50-12.5 mg QD and it is on back order. Pharmacy and pt requesting to have the medications sent in separately. Rx's verified, ordered and set to EP. Pt will check with the pharmacy.

## 2020-01-14 NOTE — TELEPHONE ENCOUNTER
Rx sent to pharmacy as below:    Requested Prescriptions     Signed Prescriptions Disp Refills    losartan (COZAAR) 50 MG tablet 90 tablet 3     Sig: Take 1 tablet by mouth daily     Authorizing Provider: Ray Maldonado    hydrochlorothiazide (HYDRODIURIL) 12.5 MG tablet 90 tablet 3     Sig: Take 1 tablet by mouth daily     Authorizing Provider: Ray Maldonado           Electronically signed by Berry Fulton MD on 1/14/2020 at 10:38 AM

## 2020-10-07 ENCOUNTER — HOSPITAL ENCOUNTER (OUTPATIENT)
Dept: WOMENS IMAGING | Age: 65
Discharge: HOME | End: 2020-10-07
Payer: COMMERCIAL

## 2020-10-07 PROCEDURE — 77063 BREAST TOMOSYNTHESIS BI: CPT

## 2020-10-15 ENCOUNTER — TELEPHONE (OUTPATIENT)
Dept: FAMILY MEDICINE CLINIC | Age: 65
End: 2020-10-15

## 2020-10-15 NOTE — TELEPHONE ENCOUNTER
Patient calling for lab orders, She wants to  the lab orders today.  Please give her a call back to let her know a good time at [phone]

## 2020-10-29 LAB
AVERAGE GLUCOSE: 126
CHOLESTEROL, TOTAL: 176 MG/DL
CHOLESTEROL/HDL RATIO: NORMAL
HBA1C MFR BLD: 6.2 %
HDLC SERPL-MCNC: 68 MG/DL (ref 35–70)
LDL CHOLESTEROL CALCULATED: 87 MG/DL (ref 0–160)
NONHDLC SERPL-MCNC: NORMAL MG/DL
TRIGL SERPL-MCNC: 106 MG/DL
VLDLC SERPL CALC-MCNC: NORMAL MG/DL

## 2020-12-03 ENCOUNTER — TELEPHONE (OUTPATIENT)
Dept: FAMILY MEDICINE CLINIC | Age: 65
End: 2020-12-03

## 2021-01-18 ENCOUNTER — OFFICE VISIT (OUTPATIENT)
Dept: FAMILY MEDICINE CLINIC | Age: 66
End: 2021-01-18
Payer: COMMERCIAL

## 2021-01-18 VITALS
BODY MASS INDEX: 26.76 KG/M2 | SYSTOLIC BLOOD PRESSURE: 122 MMHG | HEART RATE: 88 BPM | OXYGEN SATURATION: 98 % | DIASTOLIC BLOOD PRESSURE: 80 MMHG | WEIGHT: 141.6 LBS | TEMPERATURE: 97.2 F

## 2021-01-18 DIAGNOSIS — Z00.00 ANNUAL PHYSICAL EXAM: Primary | ICD-10-CM

## 2021-01-18 DIAGNOSIS — I10 ESSENTIAL HYPERTENSION: ICD-10-CM

## 2021-01-18 DIAGNOSIS — Z72.0 TOBACCO ABUSE: ICD-10-CM

## 2021-01-18 PROCEDURE — G8484 FLU IMMUNIZE NO ADMIN: HCPCS | Performed by: FAMILY MEDICINE

## 2021-01-18 PROCEDURE — 99396 PREV VISIT EST AGE 40-64: CPT | Performed by: FAMILY MEDICINE

## 2021-01-18 RX ORDER — HYDROCHLOROTHIAZIDE 12.5 MG/1
12.5 TABLET ORAL DAILY
Qty: 90 TABLET | Refills: 3 | Status: SHIPPED | OUTPATIENT
Start: 2021-01-18 | End: 2022-01-20

## 2021-01-18 RX ORDER — LOSARTAN POTASSIUM 50 MG/1
50 TABLET ORAL DAILY
Qty: 90 TABLET | Refills: 3 | Status: SHIPPED | OUTPATIENT
Start: 2021-01-18 | End: 2022-01-20

## 2021-01-18 ASSESSMENT — ENCOUNTER SYMPTOMS
NAUSEA: 0
BLOOD IN STOOL: 0
EYES NEGATIVE: 1
SHORTNESS OF BREATH: 0
VOMITING: 0
ABDOMINAL PAIN: 0
DIARRHEA: 0

## 2021-01-18 ASSESSMENT — PATIENT HEALTH QUESTIONNAIRE - PHQ9: SUM OF ALL RESPONSES TO PHQ QUESTIONS 1-9: 0

## 2021-01-18 NOTE — PATIENT INSTRUCTIONS
Patient Education        Well Visit, Women 48 to 72: Care Instructions  Your Care Instructions     Physical exams can help you stay healthy. Your doctor has checked your overall health and may have suggested ways to take good care of yourself. He or she also may have recommended tests. At home, you can help prevent illness with healthy eating, regular exercise, and other steps. Follow-up care is a key part of your treatment and safety. Be sure to make and go to all appointments, and call your doctor if you are having problems. It's also a good idea to know your test results and keep a list of the medicines you take. How can you care for yourself at home? · Reach and stay at a healthy weight. This will lower your risk for many problems, such as obesity, diabetes, heart disease, and high blood pressure. · Get at least 30 minutes of exercise on most days of the week. Walking is a good choice. You also may want to do other activities, such as running, swimming, cycling, or playing tennis or team sports. · Do not smoke. Smoking can make health problems worse. If you need help quitting, talk to your doctor about stop-smoking programs and medicines. These can increase your chances of quitting for good. · Protect your skin from too much sun. When you're outdoors from 10 a.m. to 4 p.m., stay in the shade or cover up with clothing and a hat with a wide brim. Wear sunglasses that block UV rays. Even when it's cloudy, put broad-spectrum sunscreen (SPF 30 or higher) on any exposed skin. · See a dentist one or two times a year for checkups and to have your teeth cleaned. · Wear a seat belt in the car. Follow your doctor's advice about when to have certain tests. These tests can spot problems early. · Cholesterol. Your doctor will tell you how often to have this done based on your age, family history, or other things that can increase your risk for heart attack and stroke. · Blood pressure. Have your blood pressure checked during a routine doctor visit. Your doctor will tell you how often to check your blood pressure based on your age, your blood pressure results, and other factors. · Mammogram. Ask your doctor how often you should have a mammogram, which is an X-ray of your breasts. A mammogram can spot breast cancer before it can be felt and when it is easiest to treat. · Pap test and pelvic exam. Ask your doctor how often you should have a Pap test. You may not need to have a Pap test as often as you used to. · Vision. Have your eyes checked every year or two or as often as your doctor suggests. Some experts recommend that you have yearly exams for glaucoma and other age-related eye problems starting at age 48. · Hearing. Tell your doctor if you notice any change in your hearing. You can have tests to find out how well you hear. · Diabetes. Ask your doctor whether you should have tests for diabetes. · Colorectal cancer. Your risk for colorectal cancer gets higher as you get older. Some experts say that adults should start regular screening at age 48 and stop at age 76. Others say to start before age 48 or continue after age 76. Talk with your doctor about your risk and when to start and stop screening. · Thyroid disease. Talk to your doctor about whether to have your thyroid checked as part of a regular physical exam. Women have an increased chance of a thyroid problem. · Osteoporosis. You should begin tests for bone density at age 72. If you are younger than 72, ask your doctor whether you have factors that may increase your risk for this disease. You may want to have this test before age 72. · Heart attack and stroke risk. At least every 4 to 6 years, you should have your risk for heart attack and stroke assessed. Your doctor uses factors such as your age, blood pressure, cholesterol, and whether you smoke or have diabetes to show what your risk for a heart attack or stroke is over the next 10 years. When should you call for help? Watch closely for changes in your health, and be sure to contact your doctor if you have any problems or symptoms that concern you. Where can you learn more? Go to https://iSoftStone.norin.tv. org and sign in to your [employer] account. Enter H709 in the [facility] box to learn more about \"Well Visit, Women 50 to 72: Care Instructions. \"     If you do not have an account, please click on the \"Sign Up Now\" link. Current as of: May 27, 2020               Content Version: 12.6  © 3611-3401 [employer], [employer]. Care instructions adapted under license by [facility] ([facility]). If you have questions about a medical condition or this instruction, always ask your healthcare professional. [address] any warranty or liability for your use of this information.

## 2021-01-18 NOTE — PROGRESS NOTES
Procedure Laterality Date    COLONOSCOPY  2010    SHOULDER ARTHROSCOPY Left 09/07/2018    TONSILLECTOMY      TUBAL LIGATION Bilateral 1987       Social History     Socioeconomic History    Marital status:      Spouse name: Not on file    Number of children: Not on file    Years of education: Not on file    Highest education level: Not on file   Occupational History    Not on file   Social Needs    Financial resource strain: Not hard at all   [place] insecurity     Worry: Never true     Inability: Never true   [employer] needs     Medical: No     Non-medical: No   Tobacco Use    Smoking status: Current Every Day Smoker     Packs/day: 1.00     Years: 40.00     Pack years: 40.00     Types: Cigarettes    Smokeless tobacco: Never Used   Substance and Sexual Activity    Alcohol use: Yes     Comment: occ    Drug use: No    Sexual activity: Not on file   Lifestyle    Physical activity     Days per week: Not on file     Minutes per session: Not on file    Stress: Not on file   Relationships    Social connections     Talks on phone: Not on file     Gets together: Not on file     Attends Latter day service: Not on file     Active member of club or organization: Not on file     Attends meetings of clubs or organizations: Not on file     Relationship status: Not on file    Intimate partner violence     Fear of current or ex partner: Not on file     Emotionally abused: Not on file     Physically abused: Not on file     Forced sexual activity: Not on file   Other Topics Concern    Not on file   Social History Narrative    Not on file        Family History   Problem Relation Age of Onset    High Blood Pressure Mother     Cancer Mother         Breast and Colon Cancer    Cancer Father         Lung    Heart Disease Father     Cancer Paternal Grandmother     Cancer Paternal Grandfather            Vitals:    01/18/21 0806   BP: 122/80   Site: Left Upper Arm   Pulse: 88   Temp: 97.2 °F (36.2 °C) TempSrc: Temporal   SpO2: 98%   Weight: 141 lb 9.6 oz (64.2 kg)     Estimated body mass index is 26.76 kg/m² as calculated from the following:    Height as of 1/10/20: 5' 1\" (1.549 m). Weight as of this encounter: 141 lb 9.6 oz (64.2 kg). Physical Exam  Vitals signs and nursing note reviewed. Constitutional:       General: She is not in acute distress. Appearance: She is well-developed. HENT:      Head: Normocephalic and atraumatic. Right Ear: Tympanic membrane normal.      Left Ear: Tympanic membrane normal.      Mouth/Throat:      Mouth: Mucous membranes are moist.      Pharynx: No posterior oropharyngeal erythema. Eyes:      Conjunctiva/sclera: Conjunctivae normal.   Neck:      Musculoskeletal: Neck supple. Thyroid: No thyromegaly. Vascular: No carotid bruit. Cardiovascular:      Rate and Rhythm: Normal rate and regular rhythm. Heart sounds: No murmur. Pulmonary:      Effort: Pulmonary effort is normal.      Breath sounds: Normal breath sounds. No wheezing. Abdominal:      General: Bowel sounds are normal.      Palpations: Abdomen is soft. Tenderness: There is no abdominal tenderness. There is no guarding or rebound. Musculoskeletal:      Right lower leg: No edema. Left lower leg: No edema. Lymphadenopathy:      Cervical: No cervical adenopathy. Skin:     General: Skin is warm and dry. Findings: No rash. Neurological:      Mental Status: She is alert and oriented to person, place, and time.    Psychiatric:         Behavior: Behavior normal.                     Immunization History   Administered Date(s) Administered    COVID-19, Moderna, 100mcg/0.5ml 12/30/2020    Pneumococcal Polysaccharide (Oplogziov27) 10/02/2020    Tdap (Boostrix, Adacel) 03/06/2015    Zoster Recombinant (Shingrix) 10/02/2020, 12/11/2020       Health Maintenance   Topic Date Due    Low dose CT lung screening  06/14/2012    Potassium monitoring  11/08/2014  Creatinine monitoring  11/08/2014    Cervical cancer screen  03/06/2018    Flu vaccine (1) 09/01/2020    Breast cancer screen  10/07/2021    A1C test (Diabetic or Prediabetic)  10/29/2021    DTaP/Tdap/Td vaccine (2 - Td) 03/06/2025    Lipid screen  10/29/2025    Colon cancer screen colonoscopy  10/30/2025    Shingles Vaccine  Completed    Pneumococcal 0-64 years Vaccine  Completed    Hepatitis C screen  Addressed    HIV screen  Addressed    Hepatitis A vaccine  Aged Out    Hepatitis B vaccine  Aged Out    Hib vaccine  Aged Out    Meningococcal (ACWY) vaccine  Aged Out       ASSESSMENT/PLAN:    1. Annual physical exam  2. Essential hypertension  -     losartan (COZAAR) 50 MG tablet; Take 1 tablet by mouth daily, Disp-90 tablet, R-3Normal  -     hydroCHLOROthiazide (HYDRODIURIL) 12.5 MG tablet; Take 1 tablet by mouth daily, Disp-90 tablet, R-3Normal  3. Tobacco abuse    Smoking cessation advised. She declines cessation aids today. Increase exercise with walking    Update pap test with GYN    Continue current meds    Consider screening lung CT     Follow up yearly and prn    An electronic signature was used to authenticate this note.     --Katie Pena MD on 1/18/2021 at 8:29 AM

## 2021-10-29 ENCOUNTER — HOSPITAL ENCOUNTER (OUTPATIENT)
Dept: WOMENS IMAGING | Age: 66
Discharge: HOME | End: 2021-10-29
Payer: COMMERCIAL

## 2021-10-29 ENCOUNTER — NURSE ONLY (OUTPATIENT)
Dept: LAB | Age: 66
End: 2021-10-29

## 2021-10-29 DIAGNOSIS — Z12.31 VISIT FOR SCREENING MAMMOGRAM: ICD-10-CM

## 2021-10-29 PROCEDURE — 77063 BREAST TOMOSYNTHESIS BI: CPT

## 2021-11-04 LAB — CYTOLOGY THIN PREP PAP: NORMAL

## 2022-01-20 DIAGNOSIS — I10 ESSENTIAL HYPERTENSION: ICD-10-CM

## 2022-01-20 DIAGNOSIS — R73.01 IFG (IMPAIRED FASTING GLUCOSE): ICD-10-CM

## 2022-01-20 DIAGNOSIS — Z00.00 ANNUAL PHYSICAL EXAM: Primary | ICD-10-CM

## 2022-01-20 RX ORDER — LOSARTAN POTASSIUM 50 MG/1
TABLET ORAL
Qty: 30 TABLET | Refills: 0 | Status: SHIPPED | OUTPATIENT
Start: 2022-01-20 | End: 2022-01-21 | Stop reason: SDUPTHER

## 2022-01-20 RX ORDER — HYDROCHLOROTHIAZIDE 12.5 MG/1
TABLET ORAL
Qty: 30 TABLET | Refills: 0 | Status: SHIPPED | OUTPATIENT
Start: 2022-01-20 | End: 2022-02-02

## 2022-01-20 NOTE — TELEPHONE ENCOUNTER
Rx EP'd to pharmacy. Please notify patient. Requested Prescriptions     Signed Prescriptions Disp Refills    losartan (COZAAR) 50 MG tablet 30 tablet 0     Sig: TAKE 1 TABLET BY MOUTH EVERY DAY     Authorizing Provider: Ayanna HENSON    hydroCHLOROthiazide (HYDRODIURIL) 12.5 MG tablet 30 tablet 0     Sig: TAKE 1 TABLET BY MOUTH EVERY DAY     Authorizing Provider: Ayanna HENSON     Due for FLP, CMP, HbA1C. Dx:  Annual physical and IFG.   Please schedule annual exam.      Electronically signed by Olivia Hardwick MD on 1/20/2022 at 12:30 PM

## 2022-01-20 NOTE — TELEPHONE ENCOUNTER
Spoke with patient and let her know the prescriptions were sent into the pharmacy for #30/NR and that she was due for an annual appt and some lab work to be completed before that appt as well. Scheduled patient appt with CG on 2/3/2022 @ 2PM. Labs printed and put in envelope up front to be mailed to patient.

## 2022-01-20 NOTE — TELEPHONE ENCOUNTER
This medication refill is regarding a electronic request.  Refill requested by Vilma Cui. Requested Prescriptions     Pending Prescriptions Disp Refills    losartan (COZAAR) 50 MG tablet [Pharmacy Med Name: Losartan Potassium Oral Tablet 50 MG] 30 tablet 0     Sig: TAKE 1 TABLET BY MOUTH EVERY DAY    hydroCHLOROthiazide (HYDRODIURIL) 12.5 MG tablet [Pharmacy Med Name: hydroCHLOROthiazide Oral Tablet 12.5 MG] 30 tablet 0     Sig: TAKE 1 TABLET BY MOUTH EVERY DAY     Date of last visit: 1/18/2021   Date of next visit: None  Date of last refill: 1/18/2021 #90/3 for both    Last CMP:   Lab Results   Component Value Date     11/08/2013    K 4.2 11/08/2013     11/08/2013    CO2 28 11/08/2013    BUN 12 11/08/2013    CREATININE 0.8 11/08/2013    GLUCOSE 105 11/08/2013    CALCIUM 9.9 11/08/2013    LABGLOM 74 (A) 11/08/2013     Rx verified, ordered and set to EP.

## 2022-01-21 ENCOUNTER — TELEPHONE (OUTPATIENT)
Dept: FAMILY MEDICINE CLINIC | Age: 67
End: 2022-01-21

## 2022-01-21 DIAGNOSIS — I10 ESSENTIAL HYPERTENSION: ICD-10-CM

## 2022-01-21 RX ORDER — LOSARTAN POTASSIUM 50 MG/1
TABLET ORAL
Qty: 30 TABLET | Refills: 0 | Status: SHIPPED | OUTPATIENT
Start: 2022-01-21 | End: 2022-02-02

## 2022-01-21 NOTE — TELEPHONE ENCOUNTER
Rx EP'd to pharmacy. Please notify patient.       Requested Prescriptions     Signed Prescriptions Disp Refills    losartan (COZAAR) 50 MG tablet 30 tablet 0     Sig: TAKE 1 TABLET BY MOUTH EVERY DAY     Authorizing Provider: Wilder Terrazas           Electronically signed by Neville Russo MD on 1/21/2022 at 12:28 PM

## 2022-02-02 ENCOUNTER — OFFICE VISIT (OUTPATIENT)
Dept: FAMILY MEDICINE CLINIC | Age: 67
End: 2022-02-02
Payer: COMMERCIAL

## 2022-02-02 VITALS
HEART RATE: 88 BPM | TEMPERATURE: 97.7 F | WEIGHT: 142 LBS | RESPIRATION RATE: 16 BRPM | BODY MASS INDEX: 26.81 KG/M2 | HEIGHT: 61 IN | SYSTOLIC BLOOD PRESSURE: 130 MMHG | DIASTOLIC BLOOD PRESSURE: 78 MMHG

## 2022-02-02 DIAGNOSIS — Z00.00 ANNUAL PHYSICAL EXAM: Primary | ICD-10-CM

## 2022-02-02 DIAGNOSIS — F41.9 ANXIETY: ICD-10-CM

## 2022-02-02 DIAGNOSIS — I10 ESSENTIAL HYPERTENSION: ICD-10-CM

## 2022-02-02 PROCEDURE — 99396 PREV VISIT EST AGE 40-64: CPT | Performed by: FAMILY MEDICINE

## 2022-02-02 RX ORDER — HYDROCHLOROTHIAZIDE 12.5 MG/1
TABLET ORAL
Qty: 30 TABLET | Refills: 0 | Status: CANCELLED | OUTPATIENT
Start: 2022-02-02

## 2022-02-02 RX ORDER — LOSARTAN POTASSIUM AND HYDROCHLOROTHIAZIDE 12.5; 5 MG/1; MG/1
1 TABLET ORAL DAILY
Qty: 90 TABLET | Refills: 3 | Status: SHIPPED | OUTPATIENT
Start: 2022-02-02

## 2022-02-02 RX ORDER — LOSARTAN POTASSIUM 50 MG/1
TABLET ORAL
Qty: 30 TABLET | Refills: 0 | Status: CANCELLED | OUTPATIENT
Start: 2022-02-02

## 2022-02-02 RX ORDER — CLONAZEPAM 0.5 MG/1
0.5 TABLET ORAL NIGHTLY PRN
Qty: 30 TABLET | Refills: 0 | Status: SHIPPED | OUTPATIENT
Start: 2022-02-02 | End: 2022-03-04

## 2022-02-02 SDOH — ECONOMIC STABILITY: FOOD INSECURITY: WITHIN THE PAST 12 MONTHS, YOU WORRIED THAT YOUR FOOD WOULD RUN OUT BEFORE YOU GOT MONEY TO BUY MORE.: NEVER TRUE

## 2022-02-02 SDOH — ECONOMIC STABILITY: FOOD INSECURITY: WITHIN THE PAST 12 MONTHS, THE FOOD YOU BOUGHT JUST DIDN'T LAST AND YOU DIDN'T HAVE MONEY TO GET MORE.: NEVER TRUE

## 2022-02-02 ASSESSMENT — ENCOUNTER SYMPTOMS
VOMITING: 0
ABDOMINAL PAIN: 0
NAUSEA: 0
EYES NEGATIVE: 1
BLOOD IN STOOL: 0
DIARRHEA: 0
SHORTNESS OF BREATH: 0

## 2022-02-02 ASSESSMENT — PATIENT HEALTH QUESTIONNAIRE - PHQ9
1. LITTLE INTEREST OR PLEASURE IN DOING THINGS: 0
SUM OF ALL RESPONSES TO PHQ QUESTIONS 1-9: 0
SUM OF ALL RESPONSES TO PHQ QUESTIONS 1-9: 0
2. FEELING DOWN, DEPRESSED OR HOPELESS: 0
SUM OF ALL RESPONSES TO PHQ QUESTIONS 1-9: 0
SUM OF ALL RESPONSES TO PHQ QUESTIONS 1-9: 0
SUM OF ALL RESPONSES TO PHQ9 QUESTIONS 1 & 2: 0

## 2022-02-02 ASSESSMENT — SOCIAL DETERMINANTS OF HEALTH (SDOH): HOW HARD IS IT FOR YOU TO PAY FOR THE VERY BASICS LIKE FOOD, HOUSING, MEDICAL CARE, AND HEATING?: NOT HARD AT ALL

## 2022-02-02 NOTE — PATIENT INSTRUCTIONS
Patient Education        Well Visit, Women 48 to 72: Care Instructions  Overview     Well visits can help you stay healthy. Your doctor has checked your overall health and may have suggested ways to take good care of yourself. Your doctor also may have recommended tests. At home, you can help prevent illness with healthy eating, regular exercise, and other steps. Follow-up care is a key part of your treatment and safety. Be sure to make and go to all appointments, and call your doctor if you are having problems. It's also a good idea to know your test results and keep a list of the medicines you take. How can you care for yourself at home? · Get screening tests that you and your doctor decide on. Screening helps find diseases before any symptoms appear. · Eat healthy foods. Choose fruits, vegetables, whole grains, protein, and low-fat dairy foods. Limit fat, especially saturated fat. Reduce salt in your diet. · Limit alcohol. Have no more than 1 drink a day or 7 drinks a week. · Get at least 30 minutes of exercise on most days of the week. Walking is a good choice. You also may want to do other activities, such as running, swimming, cycling, or playing tennis or team sports. · Reach and stay at a healthy weight. This will lower your risk for many problems, such as obesity, diabetes, heart disease, and high blood pressure. · Do not smoke. Smoking can make health problems worse. If you need help quitting, talk to your doctor about stop-smoking programs and medicines. These can increase your chances of quitting for good. · Care for your mental health. It is easy to get weighed down by worry and stress. Learn strategies to manage stress, like deep breathing and mindfulness, and stay connected with your family and community. If you find you often feel sad or hopeless, talk with your doctor. Treatment can help.   · Talk to your doctor about whether you have any risk factors for sexually transmitted infections (STIs). You can help prevent STIs if you wait to have sex with a new partner (or partners) until you've each been tested for STIs. It also helps if you use condoms (male or female condoms) and if you limit your sex partners to one person who only has sex with you. Vaccines are available for some STIs. · If you think you may have a problem with alcohol or drug use, talk to your doctor. This includes prescription medicines (such as amphetamines and opioids) and illegal drugs (such as cocaine and methamphetamine). Your doctor can help you figure out what type of treatment is best for you. · Protect your skin from too much sun. When you're outdoors from 10 a.m. to 4 p.m., stay in the shade or cover up with clothing and a hat with a wide brim. Wear sunglasses that block UV rays. Even when it's cloudy, put broad-spectrum sunscreen (SPF 30 or higher) on any exposed skin. · See a dentist one or two times a year for checkups and to have your teeth cleaned. · Wear a seat belt in the car. When should you call for help? Watch closely for changes in your health, and be sure to contact your doctor if you have any problems or symptoms that concern you. Where can you learn more? Go to https://[email or web address]. org and sign in to your [employer] account. Enter E647 in the [facility] box to learn more about \"Well Visit, Women 50 to 72: Care Instructions. \"     If you do not have an account, please click on the \"Sign Up Now\" link. Current as of: October 6, 2021               Content Version: 13.1  © 1468-9529 Healthwise, Incorporated. Care instructions adapted under license by [facility] ([facility]). If you have questions about a medical condition or this instruction, always ask your healthcare professional. [place] [address] any warranty or liability for your use of this information.

## 2022-02-02 NOTE — PROGRESS NOTES
2022    Maral Goldsmith (:  1955) is a 61 y.o. female, here for a preventive medicine evaluation. Patient Active Problem List   Diagnosis    Tobacco abuse    Essential hypertension    IFG (impaired fasting glucose)     Patient reports some increased anxiety and insomnia since the death of her  2 weeks ago from Parkinson's disease and COVID-19. She is having some difficulty initiating sleep and has significant worry about finances. Her blood pressures have been stable. She takes all prescribed medications as directed and denies side effects. No recent illnesses or hospitalizations. She is due for yearly labs and states that she will complete them soon. She already has the order. She continues to smoke and is not interested in quitting at this point. She continues to work full-time. BMI 26.83. Review of Systems   Constitutional: Negative for chills, fatigue and fever. HENT: Negative. Eyes: Negative. Respiratory: Negative for shortness of breath. Cardiovascular: Negative for chest pain, palpitations and leg swelling. Gastrointestinal: Negative for abdominal pain, blood in stool, diarrhea, nausea and vomiting. Genitourinary: Negative for dysuria. Musculoskeletal: Negative for arthralgias and myalgias. Skin: Negative for rash. Neurological: Negative for dizziness and headaches. Hematological: Negative for adenopathy. Psychiatric/Behavioral: Positive for sleep disturbance. The patient is nervous/anxious. All other systems reviewed and are negative. Prior to Visit Medications    Medication Sig Taking? Authorizing Provider   losartan-hydroCHLOROthiazide (HYZAAR) 50-12.5 MG per tablet Take 1 tablet by mouth daily Yes Allan Quispe MD   clonazePAM (KLONOPIN) 0.5 MG tablet Take 1 tablet by mouth nightly as needed for Anxiety for up to 30 days.  Yes Allan Quispe MD   losartan (COZAAR) 50 MG tablet TAKE 1 TABLET BY MOUTH EVERY DAY Yes Krish Watson MD   hydroCHLOROthiazide (HYDRODIURIL) 12.5 MG tablet TAKE 1 TABLET BY MOUTH EVERY DAY Yes Krish Watson MD   aspirin 81 MG chewable tablet Take 1 tablet by mouth daily. Yes Ettie Sport, APRN - CNP        Allergies   Allergen Reactions    Codeine Itching    Vicodin [Hydrocodone-Acetaminophen] Hives       Past Medical History:   Diagnosis Date    Hypertension     Rheumatic fever age 10    Tobacco abuse        Past Surgical History:   Procedure Laterality Date    COLONOSCOPY  2010    SHOULDER ARTHROSCOPY Left 09/07/2018    TONSILLECTOMY      TUBAL LIGATION Bilateral 1987       Social History     Socioeconomic History    Marital status:      Spouse name: Not on file    Number of children: Not on file    Years of education: Not on file    Highest education level: Not on file   Occupational History    Not on file   Tobacco Use    Smoking status: Current Every Day Smoker     Packs/day: 1.00     Years: 42.00     Pack years: 42.00     Types: Cigarettes    Smokeless tobacco: Never Used   Vaping Use    Vaping Use: Never used   Substance and Sexual Activity    Alcohol use: Yes     Comment: occ    Drug use: No    Sexual activity: Not on file   Other Topics Concern    Not on file   Social History Narrative    Not on file     Social Determinants of Health     Financial Resource Strain: Low Risk     Difficulty of Paying Living Expenses: Not hard at all   Food Insecurity: No Food Insecurity    Worried About Running Out of Food in the Last Year: Never true    [place] of Food in the Last Year: Never true   Transportation Needs:     Lack of Transportation (Medical): Not on file    Lack of Transportation (Non-Medical):  Not on file   Physical Activity:     Days of Exercise per Week: Not on file    Minutes of Exercise per Session: Not on file   Stress:     Feeling of Stress : Not on file   Social Connections:     Frequency of Communication with Friends and Family: Not on file    Frequency of Social Gatherings with Friends and Family: Not on file    Attends Zoroastrianism Services: Not on file    Active Member of Clubs or Organizations: Not on file    Attends Club or Organization Meetings: Not on file    Marital Status: Not on file   Intimate Partner Violence:     Fear of Current or Ex-Partner: Not on file    Emotionally Abused: Not on file    Physically Abused: Not on file    Sexually Abused: Not on file   Housing Stability:     Unable to Pay for Housing in the Last Year: Not on file    Number of [place] in the Last Year: Not on file    Unstable Housing in the Last Year: Not on file        Family History   Problem Relation Age of Onset    High Blood Pressure Mother     Breast Cancer Mother 68    Colon Cancer Mother 80    Cancer Father         Lung    Heart Disease Father     Cancer Paternal Grandmother     Cancer Paternal Grandfather        ADVANCE DIRECTIVE: N, <no information>    Vitals:    02/02/22 1601   BP: 130/78   Pulse: 88   Resp: 16   Temp: 97.7 °F (36.5 °C)   TempSrc: Oral   Weight: 142 lb (64.4 kg)   Height: 5' 1\" (1.549 m)     Estimated body mass index is 26.83 kg/m² as calculated from the following:    Height as of this encounter: 5' 1\" (1.549 m). Weight as of this encounter: 142 lb (64.4 kg). Physical Exam  Vitals and nursing note reviewed. Constitutional:       General: She is not in acute distress. Appearance: She is well-developed. HENT:      Head: Normocephalic and atraumatic. Right Ear: Tympanic membrane normal.      Left Ear: Tympanic membrane normal.      Mouth/Throat:      Mouth: Mucous membranes are moist.      Pharynx: No posterior oropharyngeal erythema. Eyes:      Conjunctiva/sclera: Conjunctivae normal.   Neck:      Thyroid: No thyromegaly. Vascular: No carotid bruit. Cardiovascular:      Rate and Rhythm: Normal rate and regular rhythm. Heart sounds: No murmur heard.       Pulmonary:      Effort: Pulmonary effort is normal.      Breath sounds: Normal breath sounds. No wheezing. Abdominal:      General: Bowel sounds are normal.      Palpations: Abdomen is soft. Tenderness: There is no abdominal tenderness. There is no guarding or rebound. Musculoskeletal:      Cervical back: Neck supple. Right lower leg: No edema. Left lower leg: No edema. Lymphadenopathy:      Cervical: No cervical adenopathy. Skin:     General: Skin is warm and dry. Findings: No rash. Neurological:      Mental Status: She is alert and oriented to person, place, and time. Psychiatric:         Behavior: Behavior normal.           Immunization History   Administered Date(s) Administered    COVID-19, [place], Primary or Immunocompromised, PF, 100mcg/0.5mL 12/30/2020, 01/27/2021    Pneumococcal Polysaccharide ([id number]) 10/02/2020    Tdap (Boostrix, Adacel) 03/06/2015    Zoster Recombinant (Shingrix) 10/02/2020, 12/11/2020       Health Maintenance   Topic Date Due    Depression Screen  Never done    Potassium monitoring  11/08/2014    Creatinine monitoring  11/08/2014    COVID-19 Vaccine (3 - Booster for Moderna series) 06/27/2021    Flu vaccine (1) Never done    A1C test (Diabetic or Prediabetic)  10/29/2021    Breast cancer screen  10/29/2022    Cervical cancer screen  10/29/2024    DTaP/Tdap/Td vaccine (2 - Td or Tdap) 03/06/2025    Pneumococcal 0-64 years Vaccine (2 of 2 - PPSV23) 10/02/2025    Lipid screen  10/29/2025    Colon cancer screen colonoscopy  10/30/2025    Shingles Vaccine  Completed    Hepatitis C screen  Addressed    HIV screen  Addressed    Hepatitis A vaccine  Aged Out    Hepatitis B vaccine  Aged Out    Hib vaccine  Aged Out    Meningococcal (ACWY) vaccine  Aged Out    Low dose CT lung screening  Discontinued          ASSESSMENT/PLAN:    1. Annual physical exam  2. Essential hypertension  -     losartan-hydroCHLOROthiazide (HYZAAR) 50-12.5 MG per tablet;  Take 1 tablet by mouth daily, Disp-90 tablet, R-3Print  3. Anxiety  -     clonazePAM (KLONOPIN) 0.5 MG tablet; Take 1 tablet by mouth nightly as needed for Anxiety for up to 30 days. , Disp-30 tablet, R-0Print      Continue Hyzaar as above    Short-term prescription for Klonopin given as above. OARRS report reviewed no contraindications or problems noted    She will proceed with yearly labs as ordered. We will notify her when results are available    Smoking cessation encouraged    Follow-up yearly and as needed       An electronic signature was used to authenticate this note.     --Krish Watson MD on 2/2/2022 at 4:47 PM

## 2022-02-20 DIAGNOSIS — I10 ESSENTIAL HYPERTENSION: ICD-10-CM

## 2022-02-21 RX ORDER — LOSARTAN POTASSIUM 50 MG/1
TABLET ORAL
Qty: 30 TABLET | Refills: 0 | OUTPATIENT
Start: 2022-02-21

## 2022-02-21 NOTE — TELEPHONE ENCOUNTER
Refused due to Losartan being discontinued 2/2/2022 by Kenan Gibbs MD and being changed to Losartain-HCTZ.

## 2022-04-08 DIAGNOSIS — I10 ESSENTIAL HYPERTENSION: ICD-10-CM

## 2022-04-10 DIAGNOSIS — I10 ESSENTIAL HYPERTENSION: ICD-10-CM

## 2022-04-11 DIAGNOSIS — I10 ESSENTIAL HYPERTENSION: ICD-10-CM

## 2022-04-11 RX ORDER — HYDROCHLOROTHIAZIDE 12.5 MG/1
TABLET ORAL
Qty: 30 TABLET | Refills: 0 | OUTPATIENT
Start: 2022-04-11

## 2022-04-11 RX ORDER — LOSARTAN POTASSIUM 50 MG/1
TABLET ORAL
Qty: 30 TABLET | Refills: 0 | OUTPATIENT
Start: 2022-04-11

## 2022-04-11 NOTE — TELEPHONE ENCOUNTER
Refused due to Losartan 50 mg being changed to Losartan-HCTZ 50-12.5 mg on 2/2/22 and sent into [facility] for a year supply. Pt does not need a refill of this prescription at this time.

## 2022-04-11 NOTE — TELEPHONE ENCOUNTER
Refused due to Hydrochlorothiazide 12.5 mg being discontinued and changed into Losartan-HCTZ 50-12.5 mg. Losartan-HCTZ sent in to [facility] for a year supply. Pt does not need a refill of this medication at this time.

## 2022-04-11 NOTE — TELEPHONE ENCOUNTER
Refused due to hydrochlorothiazide 12.5 mg being changed to Losartan-HCTZ 50-12.5 mg on 2/2/22 and sent into [facility] for a year supply. Pt does not need a refill of this prescription at this time.

## 2022-04-12 DIAGNOSIS — I10 ESSENTIAL HYPERTENSION: ICD-10-CM

## 2022-04-12 RX ORDER — LOSARTAN POTASSIUM 50 MG/1
TABLET ORAL
Qty: 30 TABLET | Refills: 0 | OUTPATIENT
Start: 2022-04-12

## 2022-04-12 NOTE — TELEPHONE ENCOUNTER
Request sent from [facility] for refill of losartan 50 mg qd. Last seen 2/2/22, no future appt scheduled. Patient recently switched to losartan hctz. This request denied.

## 2022-04-16 DIAGNOSIS — I10 ESSENTIAL HYPERTENSION: ICD-10-CM

## 2022-04-18 RX ORDER — LOSARTAN POTASSIUM 50 MG/1
TABLET ORAL
Qty: 30 TABLET | Refills: 0 | OUTPATIENT
Start: 2022-04-18

## 2022-04-18 RX ORDER — HYDROCHLOROTHIAZIDE 12.5 MG/1
TABLET ORAL
Qty: 30 TABLET | Refills: 0 | OUTPATIENT
Start: 2022-04-18

## 2022-04-18 NOTE — TELEPHONE ENCOUNTER
Losartan and Hydrochlorothiazide refused due to medications being combined on 2/2/22 and #90/3 sent to [facility] for a year supply. Pt does not need a refill.

## 2022-04-20 DIAGNOSIS — I10 ESSENTIAL HYPERTENSION: ICD-10-CM

## 2022-04-20 RX ORDER — HYDROCHLOROTHIAZIDE 12.5 MG/1
TABLET ORAL
Qty: 30 TABLET | Refills: 0 | OUTPATIENT
Start: 2022-04-20

## 2022-04-20 RX ORDER — LOSARTAN POTASSIUM 50 MG/1
TABLET ORAL
Qty: 30 TABLET | Refills: 0 | OUTPATIENT
Start: 2022-04-20

## 2022-04-20 NOTE — TELEPHONE ENCOUNTER
Losartan and Hydrochlorothiazide medications discontinued and combined into Losartan-HCTZ 50-12.5 mg on 2/2/22 #90/3 was sent into [facility] for a year supply. Pt does not need a refill at this time.

## 2023-01-23 ENCOUNTER — OFFICE VISIT (OUTPATIENT)
Dept: FAMILY MEDICINE CLINIC | Age: 68
End: 2023-01-23
Payer: COMMERCIAL

## 2023-01-23 VITALS
HEART RATE: 88 BPM | WEIGHT: 137.4 LBS | SYSTOLIC BLOOD PRESSURE: 154 MMHG | DIASTOLIC BLOOD PRESSURE: 72 MMHG | BODY MASS INDEX: 25.96 KG/M2 | RESPIRATION RATE: 16 BRPM

## 2023-01-23 DIAGNOSIS — Z72.0 TOBACCO ABUSE: ICD-10-CM

## 2023-01-23 DIAGNOSIS — R73.01 IFG (IMPAIRED FASTING GLUCOSE): Primary | ICD-10-CM

## 2023-01-23 DIAGNOSIS — I10 ESSENTIAL HYPERTENSION: ICD-10-CM

## 2023-01-23 DIAGNOSIS — Z13.220 LIPID SCREENING: ICD-10-CM

## 2023-01-23 PROBLEM — M19.012 OSTEOARTHRITIS OF LEFT GLENOHUMERAL JOINT: Status: ACTIVE | Noted: 2023-01-23

## 2023-01-23 PROBLEM — M25.519 SHOULDER JOINT PAIN: Status: ACTIVE | Noted: 2023-01-23

## 2023-01-23 PROCEDURE — 99214 OFFICE O/P EST MOD 30 MIN: CPT | Performed by: NURSE PRACTITIONER

## 2023-01-23 PROCEDURE — 93000 ELECTROCARDIOGRAM COMPLETE: CPT | Performed by: NURSE PRACTITIONER

## 2023-01-23 PROCEDURE — 3074F SYST BP LT 130 MM HG: CPT | Performed by: NURSE PRACTITIONER

## 2023-01-23 PROCEDURE — 3078F DIAST BP <80 MM HG: CPT | Performed by: NURSE PRACTITIONER

## 2023-01-23 PROCEDURE — 1123F ACP DISCUSS/DSCN MKR DOCD: CPT | Performed by: NURSE PRACTITIONER

## 2023-01-23 RX ORDER — LOSARTAN POTASSIUM AND HYDROCHLOROTHIAZIDE 12.5; 5 MG/1; MG/1
1 TABLET ORAL DAILY
Qty: 90 TABLET | Refills: 3 | Status: CANCELLED | OUTPATIENT
Start: 2023-01-23

## 2023-01-23 RX ORDER — MELOXICAM 15 MG/1
TABLET ORAL
COMMUNITY
Start: 2023-01-16

## 2023-01-23 RX ORDER — LOSARTAN POTASSIUM AND HYDROCHLOROTHIAZIDE 25; 100 MG/1; MG/1
1 TABLET ORAL DAILY
Qty: 90 TABLET | Refills: 1 | Status: SHIPPED | OUTPATIENT
Start: 2023-01-23

## 2023-01-23 ASSESSMENT — ENCOUNTER SYMPTOMS
NAUSEA: 0
DIARRHEA: 0
SHORTNESS OF BREATH: 0
COLOR CHANGE: 0
WHEEZING: 0
EYE PAIN: 0
COUGH: 0
ABDOMINAL PAIN: 0
FACIAL SWELLING: 0
VOMITING: 0
SORE THROAT: 0
TROUBLE SWALLOWING: 0
BACK PAIN: 0
SINUS PAIN: 0

## 2023-01-23 ASSESSMENT — PATIENT HEALTH QUESTIONNAIRE - PHQ9
SUM OF ALL RESPONSES TO PHQ QUESTIONS 1-9: 0
SUM OF ALL RESPONSES TO PHQ9 QUESTIONS 1 & 2: 0
SUM OF ALL RESPONSES TO PHQ QUESTIONS 1-9: 0
1. LITTLE INTEREST OR PLEASURE IN DOING THINGS: 0
2. FEELING DOWN, DEPRESSED OR HOPELESS: 0
SUM OF ALL RESPONSES TO PHQ QUESTIONS 1-9: 0
SUM OF ALL RESPONSES TO PHQ QUESTIONS 1-9: 0

## 2023-01-23 NOTE — PROGRESS NOTES
[facility]  [address] [place] [id number]  Dept: [phone]  Dept Fax: [phone]  Loc: [phone]     2023     Evan Sahu (:  1955) is a 74 y.o. female, here for evaluation of the following medical concerns:    Chief Complaint   Patient presents with    Hypertension     203/83, headache and head pressure, taking 1.5 tablets of her Losartan, blurred vision        HPI  Pt presents to the office today for HTN and increased readings. Pt reports that yesterday she had a big headache with some blurred vision and she checked her BP and it was 200/80's. She called the on call provider, Dr Asha Weir and was told she could take an extra 1/2 tablet of Hyzaar. She did this and that helped. Her Headache went away with some tylenol. Today she had the elevated reading and headache again and took another 1/2 extra Hyzaar with relief. She thought she should get checked out. Pt feels OK now, no headache, dizziness or chest pain. Pt is a smoker and BMI is 25%. Treatment Adherence:   Medication compliance:  compliant all of the time  Diet compliance:  compliant most of the time  Weight trend: stable    Hypertension:  Home blood pressure monitoring: Yes - see attached. Patient complains of headache. Antihypertensive medication side effects: no medication side effects noted. Use of agents associated with hypertension: none. Sodium (meq/l)   Date Value   2013 141    BUN (mg/dl)   Date Value   2013 12    Glucose (mg/dl)   Date Value   2013 105      Potassium (meq/l)   Date Value   2013 4.2    Creatinine (mg/dl)   Date Value   2013 0.8           Lab Results   Component Value Date    CHOL 176 10/29/2020    TRIG 106 10/29/2020    HDL 68 10/29/2020    LDLCALC 87 10/29/2020     No results found for: ALT, AST       Review of Systems   Constitutional:  Positive for fatigue. Negative for chills and fever. HENT:  Negative for congestion, facial swelling, sinus pain, sore throat and trouble swallowing. Eyes:  Negative for pain and visual disturbance. Respiratory:  Negative for cough, shortness of breath and wheezing. Cardiovascular:  Negative for chest pain and palpitations. Gastrointestinal:  Negative for abdominal pain, diarrhea, nausea and vomiting. Genitourinary:  Negative for difficulty urinating, dysuria and urgency. Musculoskeletal:  Negative for back pain, gait problem and neck pain. Skin:  Negative for color change and rash. Neurological:  Negative for dizziness, weakness and headaches. Psychiatric/Behavioral:  Negative for agitation and sleep disturbance. The patient is not nervous/anxious. Prior to Visit Medications    Medication Sig Taking? Authorizing Provider   meloxicam (MOBIC) 15 MG tablet TAKE 1 TABLET BY MOUTH EVERY DAY Yes Historical Provider, MD   losartan-hydroCHLOROthiazide (HYZAAR) 100-25 MG per tablet Take 1 tablet by mouth daily Yes CEM Peng - CNP   aspirin 81 MG chewable tablet Take 1 tablet by mouth daily. Yes CEM Ndiaye - CNP   clonazePAM (KLONOPIN) 0.5 MG tablet Take 1 tablet by mouth nightly as needed for Anxiety for up to 30 days. Patient not taking: Reported on 1/23/2023  Joselito Snell MD        Social History     Tobacco Use    Smoking status: Every Day     Packs/day: 1.00     Years: 42.00     Pack years: 42.00     Types: Cigarettes    Smokeless tobacco: Never   Substance Use Topics    Alcohol use: Yes     Comment: occ        Vitals:    01/23/23 1430   BP: (!) 154/72   Pulse: 88   Resp: 16   Weight: 137 lb 6.4 oz (62.3 kg)     Estimated body mass index is 25.96 kg/m² as calculated from the following:    Height as of 2/2/22: 5' 1\" (1.549 m). Weight as of this encounter: 137 lb 6.4 oz (62.3 kg). Physical Exam  Vitals reviewed. Constitutional:       General: She is not in acute distress. Appearance: Normal appearance. She is well-developed. HENT:      Head: Normocephalic and atraumatic. Right Ear: Hearing, tympanic membrane, ear canal and external ear normal.      Left Ear: Hearing, tympanic membrane, ear canal and external ear normal.      Nose: Nose normal. No nasal tenderness. Mouth/Throat:      Lips: Pink. Mouth: Mucous membranes are moist. No oral lesions. Pharynx: Oropharynx is clear. Uvula midline. Eyes:      General:         Right eye: No discharge. Left eye: No discharge. Extraocular Movements: Extraocular movements intact. Conjunctiva/sclera: Conjunctivae normal.      Pupils: Pupils are equal, round, and reactive to light. Neck:      Vascular: No carotid bruit. Trachea: No tracheal deviation. Cardiovascular:      Rate and Rhythm: Normal rate and regular rhythm. Heart sounds: Normal heart sounds. No murmur heard. Pulmonary:      Effort: Pulmonary effort is normal. No respiratory distress. Breath sounds: Normal breath sounds. Abdominal:      General: Bowel sounds are normal.      Palpations: Abdomen is soft. Tenderness: There is no abdominal tenderness. Musculoskeletal:      Cervical back: Full passive range of motion without pain and neck supple. Lymphadenopathy:      Head:      Right side of head: No submental, submandibular, tonsillar, preauricular, posterior auricular or occipital adenopathy. Left side of head: No submental, submandibular, tonsillar, preauricular, posterior auricular or occipital adenopathy. Cervical: No cervical adenopathy. Skin:     General: Skin is warm and dry. Findings: No rash. Neurological:      General: No focal deficit present. Mental Status: She is alert and oriented to person, place, and time. Cranial Nerves: No cranial nerve deficit.       Coordination: Coordination normal.   Psychiatric:         Mood and Affect: Mood normal.         Behavior: Behavior normal. Thought Content: Thought content normal.         Judgment: Judgment normal.       ASSESSMENT/PLAN:  1. Essential hypertension  - TSH; Future  - T4, Free; Future  - Comprehensive Metabolic Panel; Future  - CBC with Auto Differential; Future  - losartan-hydroCHLOROthiazide (HYZAAR) 100-25 MG per tablet; Take 1 tablet by mouth daily  Dispense: 90 tablet; Refill: 1  - EKG 12 lead    2. IFG (impaired fasting glucose)  - Hemoglobin A1C; Future    3. Tobacco abuse  - EKG 12 lead    4. Lipid screening  - Lipid Panel; Future    - EKG in office today was stable from previous. Pt asymptomatic currently. - Increase losartan/HCTZ to 100/25 mg daily. Monitor BP. Goal <140/90. Tracking sheet provided. Pt to bring to follow up appt in 2 weeks. - Labs completed ASAP after 12 hours fasting. - ER for any acute changes, headaches, chest pain, blurred vision or dizziness. Pt agreeable. Return in about 2 weeks (around 2/6/2023), or if symptoms worsen or fail to improve, for Routine follow up, Medication check, Result discussion. Patient given educational materials - see patient instructions. Discussed use, benefit, and side effects of prescribed medications. All patient questions answered. Pt voiced understanding. Reviewed health maintenance. An electronic signature was used to authenticate this note.     --CEM Gaston - CNP on 1/23/2023 at 3:01 PM

## 2023-01-24 LAB
AVERAGE GLUCOSE: 129
CHOLESTEROL, TOTAL: 213 MG/DL
CHOLESTEROL/HDL RATIO: NORMAL
HBA1C MFR BLD: 6.3 %
HDLC SERPL-MCNC: 51 MG/DL (ref 35–70)
LDL CHOLESTEROL CALCULATED: 111 MG/DL (ref 0–160)
NONHDLC SERPL-MCNC: NORMAL MG/DL
TRIGL SERPL-MCNC: 255 MG/DL
VLDLC SERPL CALC-MCNC: NORMAL MG/DL

## 2023-01-25 ENCOUNTER — TELEPHONE (OUTPATIENT)
Dept: FAMILY MEDICINE CLINIC | Age: 68
End: 2023-01-25

## 2023-01-25 NOTE — TELEPHONE ENCOUNTER
Pt called stating that she saw WS on 1/23/23 for elevated BP and the dosage of Losartan-HCTZ was increased to 100/25 mg daily (taking 50/12.5 2 tablets daily as she has this on hand). She took the increased dose yesterday and states that right away she started feeling lightheaded and felt this way all day. She did take her BP yesterday before taking the medication and it was 117/81. She took her BP this morning before taking the medication and it was 96/70 (pulse 105 at rest) so she only took 1 tablet of the 50/12.5 mg. Pt wants to know if she should continue taking 1 tablet daily of the 50/12.5 mg or what is recommended. Please advise. She also states that she has a twisting pain off/on right under her right breast in the under wire area that she forgot to mention to [facility] yesterday.

## 2023-01-25 NOTE — TELEPHONE ENCOUNTER
Lmtcb , also advised pt on VM to go to the ER if she is actively having chest pain , sob or any new urgent symptoms. Requested a call back either way.

## 2023-01-25 NOTE — TELEPHONE ENCOUNTER
Noted. OK to decrease her Losartan/HCTZ back to the original 50-12.5 mg dose. I would recommend ER for evaluation of the chest pain. EKG was done at her appt and was OK, but that is just that moment in time, she could be having some heart issues that area causing her BP fluctuations. They can assess her and also adjust her medications based on her BP at that time. OK to follow up in our office after her ER visit.  -WS

## 2023-01-25 NOTE — TELEPHONE ENCOUNTER
Spoke with pt and notified her of WS response. Pt verbalized understand and will only take one blood pressure tablet daily at this time. Pt let me know that she will keep checking her blood pressure and keep a log to bring to appt with WS on 2/6/23 and if she has any acute urgent symptoms she will go to the ED.

## 2023-01-26 ENCOUNTER — TELEPHONE (OUTPATIENT)
Dept: FAMILY MEDICINE CLINIC | Age: 68
End: 2023-01-26

## 2023-01-26 DIAGNOSIS — E78.00 ELEVATED LDL CHOLESTEROL LEVEL: ICD-10-CM

## 2023-01-26 DIAGNOSIS — R79.9 ELEVATED BUN: ICD-10-CM

## 2023-01-26 DIAGNOSIS — D58.2 ELEVATED HEMOGLOBIN (HCC): ICD-10-CM

## 2023-01-26 DIAGNOSIS — R71.8 ELEVATED RED BLOOD CELL COUNT: ICD-10-CM

## 2023-01-26 DIAGNOSIS — E78.2 ELEVATED CHOLESTEROL WITH ELEVATED TRIGLYCERIDES: Primary | ICD-10-CM

## 2023-01-26 DIAGNOSIS — R71.8 ELEVATED HEMATOCRIT: ICD-10-CM

## 2023-01-26 RX ORDER — ROSUVASTATIN CALCIUM 10 MG/1
10 TABLET, COATED ORAL NIGHTLY
Qty: 90 TABLET | Refills: 1 | Status: SHIPPED | OUTPATIENT
Start: 2023-01-26

## 2023-01-26 NOTE — TELEPHONE ENCOUNTER
Pt notified. She is agreeable to starting medication. Rx pended with correct pharmacy. Labs ordered and mailed.

## 2023-01-26 NOTE — TELEPHONE ENCOUNTER
----- Message from Devinsarah beth Daniels, CEM - CNP sent at 1/25/2023  1:15 PM EST -----  Please call pt and let her know that her total cholesterol is elevated at 213,  and triglycerides are elevated at 255. With her elevated numbers, its recommended that she start a statin to help lower her cholesterol. I would recommend Crestor 10 mg nightly, #90/1 and Repeat FLP, AST and ALT in 3 months. She also has elevated Hemoglobin, hematocrit and RBC's, this is most likely from smoking. I would like to repeat in 3 months to monitor as well. Her BUN is elevated to 30. She needs to increase her clear fluids and help kidneys work, Repeat BUN and creatinine in 2 weeks. A1C was 6.3%.   She is still prediabetic. -WS    The 10-year ASCVD risk score (Woody FERGUSON, et al., 2019) is: 20.1%    Values used to calculate the score:      Age: 74 years      Sex: Female      Is Non- : No      Diabetic: No      Tobacco smoker: Yes      Systolic Blood Pressure: 267 mmHg      Is BP treated: Yes      HDL Cholesterol: 51 mg/dL      Total Cholesterol: 213 mg/dL

## 2023-02-06 ENCOUNTER — OFFICE VISIT (OUTPATIENT)
Dept: FAMILY MEDICINE CLINIC | Age: 68
End: 2023-02-06
Payer: COMMERCIAL

## 2023-02-06 VITALS
BODY MASS INDEX: 26.41 KG/M2 | RESPIRATION RATE: 16 BRPM | DIASTOLIC BLOOD PRESSURE: 62 MMHG | SYSTOLIC BLOOD PRESSURE: 120 MMHG | HEART RATE: 72 BPM | WEIGHT: 139.8 LBS

## 2023-02-06 DIAGNOSIS — Z12.31 ENCOUNTER FOR SCREENING MAMMOGRAM FOR MALIGNANT NEOPLASM OF BREAST: Primary | ICD-10-CM

## 2023-02-06 DIAGNOSIS — E78.2 MIXED HYPERLIPIDEMIA: ICD-10-CM

## 2023-02-06 DIAGNOSIS — R73.01 IFG (IMPAIRED FASTING GLUCOSE): ICD-10-CM

## 2023-02-06 DIAGNOSIS — Z78.0 POST-MENOPAUSAL: ICD-10-CM

## 2023-02-06 DIAGNOSIS — I10 ESSENTIAL HYPERTENSION: ICD-10-CM

## 2023-02-06 PROCEDURE — 1123F ACP DISCUSS/DSCN MKR DOCD: CPT | Performed by: NURSE PRACTITIONER

## 2023-02-06 PROCEDURE — 3074F SYST BP LT 130 MM HG: CPT | Performed by: NURSE PRACTITIONER

## 2023-02-06 PROCEDURE — 3078F DIAST BP <80 MM HG: CPT | Performed by: NURSE PRACTITIONER

## 2023-02-06 PROCEDURE — 99214 OFFICE O/P EST MOD 30 MIN: CPT | Performed by: NURSE PRACTITIONER

## 2023-02-06 RX ORDER — LOSARTAN POTASSIUM AND HYDROCHLOROTHIAZIDE 25; 100 MG/1; MG/1
1.5 TABLET ORAL DAILY
Qty: 135 TABLET | Refills: 1 | Status: SHIPPED | OUTPATIENT
Start: 2023-02-06

## 2023-02-06 SDOH — ECONOMIC STABILITY: INCOME INSECURITY: HOW HARD IS IT FOR YOU TO PAY FOR THE VERY BASICS LIKE FOOD, HOUSING, MEDICAL CARE, AND HEATING?: NOT HARD AT ALL

## 2023-02-06 SDOH — ECONOMIC STABILITY: FOOD INSECURITY: WITHIN THE PAST 12 MONTHS, YOU WORRIED THAT YOUR FOOD WOULD RUN OUT BEFORE YOU GOT MONEY TO BUY MORE.: NEVER TRUE

## 2023-02-06 SDOH — ECONOMIC STABILITY: FOOD INSECURITY: WITHIN THE PAST 12 MONTHS, THE FOOD YOU BOUGHT JUST DIDN'T LAST AND YOU DIDN'T HAVE MONEY TO GET MORE.: NEVER TRUE

## 2023-02-06 SDOH — ECONOMIC STABILITY: HOUSING INSECURITY
IN THE LAST 12 MONTHS, WAS THERE A TIME WHEN YOU DID NOT HAVE A STEADY PLACE TO SLEEP OR SLEPT IN A SHELTER (INCLUDING NOW)?: NO

## 2023-02-06 NOTE — PROGRESS NOTES
[facility]  [address] [place] [id number]  Dept: [phone]  Dept Fax: [phone]  Loc: [phone]     2023     Chastity Monson (:  1955) is a 74 y.o. female, here for evaluation of the following medical concerns:    Chief Complaint   Patient presents with    2 Week Follow-Up     Pt would like to discuss medications. Pt had completed lab work that was due after 2 weeks at [address], will call for results. Pt tried taking 2 Losartan-HCTZ and she felt like she was going to pass out, so she has only been taking 1 tablet daily. HPI  Pt presents to the office today for follow up on HTN and change in medication. She tried to double her Hyzaar dose after the last appt and that made her dizzy with low BP. She went back to 1.5 tablets of her 100/25 mg dose and that seems to be working. Overall, pt is feeling much better than 2 weeks ago and denies any chest pain, SOB or dizziness. Treatment Adherence:   Medication compliance:  compliant all of the time  Diet compliance:  compliant most of the time    Hypertension:  Home blood pressure monitoring: Yes - 120-130's/70's  . Patient denies chest pain, shortness of breath, headache, lightheadedness, and blurred vision. Antihypertensive medication side effects: no medication side effects noted. Use of agents associated with hypertension: none. BP Readings from Last 3 Encounters:   23 120/62   23 (!) 154/72   22 130/78            1                      Sodium (meq/l)   Date Value   2013 141    BUN (mg/dl)   Date Value   2013 12    Glucose (mg/dl)   Date Value   2013 105      Potassium (meq/l)   Date Value   2013 4.2    Creatinine (mg/dl)   Date Value   2013 0.8         Hyperlipidemia:  No new myalgias or GI upset on rosuvastatin (Crestor). This was just started after review of her most recent labs. Repeat labs due in 3 months.   Pt has these orders. Lab Results   Component Value Date    CHOL 213 01/24/2023    TRIG 255 01/24/2023    HDL 51 01/24/2023    LDLCALC 111 01/24/2023     No results found for: ALT, AST   The 10-year ASCVD risk score (Woody FERGUSON, et al., 2019) is: 12.7%    Values used to calculate the score:      Age: 74 years      Sex: Female      Is Non- : No      Diabetic: No      Tobacco smoker: Yes      Systolic Blood Pressure: 259 mmHg      Is BP treated: Yes      HDL Cholesterol: 51 mg/dL      Total Cholesterol: 213 mg/dL          Review of Systems   Constitutional:  Negative for chills, fatigue and fever. HENT:  Negative for congestion, facial swelling, sinus pain, sore throat and trouble swallowing. Respiratory:  Negative for cough, shortness of breath and wheezing. Cardiovascular:  Negative for chest pain and palpitations. Gastrointestinal:  Negative for abdominal pain, diarrhea, nausea and vomiting. Genitourinary:  Negative for difficulty urinating, dysuria and urgency. Musculoskeletal:  Negative for back pain, gait problem and neck pain. Skin:  Negative for color change and rash. Neurological:  Negative for dizziness, weakness and headaches. Psychiatric/Behavioral:  Negative for agitation and sleep disturbance. The patient is not nervous/anxious. Prior to Visit Medications    Medication Sig Taking? Authorizing Provider   losartan-hydroCHLOROthiazide (HYZAAR) 100-25 MG per tablet Take 1.5 tablets by mouth daily Yes CEM Isaac CNP   rosuvastatin (CRESTOR) 10 MG tablet Take 1 tablet by mouth nightly Yes Zane JanuaryCEM CNP   meloxicam (MOBIC) 15 MG tablet TAKE 1 TABLET BY MOUTH EVERY DAY Yes Historical Provider, MD   aspirin 81 MG chewable tablet Take 1 tablet by mouth daily.  Yes CEM Moya CNP        Social History     Tobacco Use    Smoking status: Every Day     Packs/day: 1.00     Years: 42.00     Pack years: 42.00     Types: Cigarettes Smokeless tobacco: Never   Substance Use Topics    Alcohol use: Yes     Comment: occ        Vitals:    02/06/23 1410   BP: 120/62   Pulse: 72   Resp: 16   Weight: 139 lb 12.8 oz (63.4 kg)     Estimated body mass index is 26.41 kg/m² as calculated from the following:    Height as of 2/2/22: 5' 1\" (1.549 m). Weight as of this encounter: 139 lb 12.8 oz (63.4 kg). Physical Exam  Vitals reviewed. Constitutional:       General: She is not in acute distress. Appearance: She is well-developed. HENT:      Head: Normocephalic and atraumatic. Right Ear: Ear canal and external ear normal.      Left Ear: Ear canal and external ear normal.      Nose: Nose normal.      Mouth/Throat:      Mouth: Mucous membranes are moist.      Pharynx: Oropharynx is clear. Eyes:      General:         Right eye: No discharge. Left eye: No discharge. Conjunctiva/sclera: Conjunctivae normal.   Cardiovascular:      Rate and Rhythm: Normal rate and regular rhythm. Heart sounds: Normal heart sounds. Pulmonary:      Effort: Pulmonary effort is normal. No respiratory distress. Breath sounds: Normal breath sounds. Abdominal:      General: Bowel sounds are normal.      Palpations: Abdomen is soft. Musculoskeletal:      Right lower leg: No edema. Left lower leg: No edema. Skin:     General: Skin is warm and dry. Neurological:      General: No focal deficit present. Mental Status: She is alert and oriented to person, place, and time. Coordination: Coordination normal.   Psychiatric:         Mood and Affect: Mood normal.         Behavior: Behavior normal.         Thought Content: Thought content normal.         Judgment: Judgment normal.       ASSESSMENT/PLAN:  1. Essential hypertension  - losartan-hydroCHLOROthiazide (HYZAAR) 100-25 MG per tablet; Take 1.5 tablets by mouth daily  Dispense: 135 tablet; Refill: 1    2.  Encounter for screening mammogram for malignant neoplasm of breast  - RADHA DIGITAL SCREEN W OR WO CAD BILATERAL; Future    3. Post-menopausal  - DEXA BONE DENSITY AXIAL SKELETON; Future    4. IFG (impaired fasting glucose)    5. Mixed hyperlipidemia    - Have labs completed as directed. Pt already has orders.    - Continue losartan-HCTZ 100/25 mg, 1.5 tablets daily. Monitor BP and call if >140/90.   - Work on diet and exercise. - Order for Mammogram and DEXA given. Return in about 6 months (around 8/6/2023), or if symptoms worsen or fail to improve, for Routine follow up, Medication check. Patient given educational materials - see patient instructions. Discussed use, benefit, and side effects of prescribed medications. All patient questions answered. Pt voiced understanding. Reviewed health maintenance. An electronic signature was used to authenticate this note.     --CEM Henry - CNP on 2/7/2023 at 7:48 AM

## 2023-02-07 PROBLEM — E78.2 MIXED HYPERLIPIDEMIA: Status: ACTIVE | Noted: 2023-02-07

## 2023-02-07 ASSESSMENT — ENCOUNTER SYMPTOMS
NAUSEA: 0
COUGH: 0
VOMITING: 0
DIARRHEA: 0
SORE THROAT: 0
SHORTNESS OF BREATH: 0
ABDOMINAL PAIN: 0
BACK PAIN: 0
SINUS PAIN: 0
COLOR CHANGE: 0
TROUBLE SWALLOWING: 0
FACIAL SWELLING: 0
WHEEZING: 0

## 2023-02-15 ENCOUNTER — HOSPITAL ENCOUNTER (OUTPATIENT)
Dept: WOMENS IMAGING | Age: 68
Discharge: HOME | End: 2023-02-15
Payer: COMMERCIAL

## 2023-02-15 DIAGNOSIS — Z12.31 ENCOUNTER FOR SCREENING MAMMOGRAM FOR MALIGNANT NEOPLASM OF BREAST: ICD-10-CM

## 2023-02-15 PROCEDURE — 77063 BREAST TOMOSYNTHESIS BI: CPT

## 2023-03-16 ENCOUNTER — TELEPHONE (OUTPATIENT)
Dept: FAMILY MEDICINE CLINIC | Age: 68
End: 2023-03-16

## 2023-03-16 NOTE — TELEPHONE ENCOUNTER
PA for Losartan-HCTZ 100-25 mg 1 1/2 tablets daily started today on CoverMyMeds. Will await response. Pt has a couple of pills left.

## 2023-03-20 NOTE — TELEPHONE ENCOUNTER
Patient called office this morning to check on the status of authorization. Per Cover My Meds, request is still under review and may take up to 5 business days. Detailed message left for patient letting her know this. She may need to check with pharmacy for a short term cash pay option to get her through until decision obtained from the insurance.

## 2023-03-22 NOTE — TELEPHONE ENCOUNTER
PA Case: [id number]  Status: Approved  Coverage Starts on: 3/22/2023   Coverage Ends on: 3/21/2024     LM on VM notifying pt that the medication was approved.

## 2023-04-24 RX ORDER — LOSARTAN POTASSIUM AND HYDROCHLOROTHIAZIDE 12.5; 5 MG/1; MG/1
TABLET ORAL
Qty: 90 TABLET | Refills: 3 | OUTPATIENT
Start: 2023-04-24

## 2023-08-14 ENCOUNTER — OFFICE VISIT (OUTPATIENT)
Dept: FAMILY MEDICINE CLINIC | Age: 68
End: 2023-08-14
Payer: MEDICARE

## 2023-08-14 VITALS
HEIGHT: 61 IN | BODY MASS INDEX: 25.6 KG/M2 | HEART RATE: 88 BPM | WEIGHT: 135.6 LBS | SYSTOLIC BLOOD PRESSURE: 136 MMHG | RESPIRATION RATE: 16 BRPM | DIASTOLIC BLOOD PRESSURE: 80 MMHG

## 2023-08-14 DIAGNOSIS — M79.672 LEFT FOOT PAIN: ICD-10-CM

## 2023-08-14 DIAGNOSIS — Z72.0 TOBACCO ABUSE: ICD-10-CM

## 2023-08-14 DIAGNOSIS — E78.2 MIXED HYPERLIPIDEMIA: ICD-10-CM

## 2023-08-14 DIAGNOSIS — Z71.89 ADVANCED CARE PLANNING/COUNSELING DISCUSSION: ICD-10-CM

## 2023-08-14 DIAGNOSIS — R73.01 IFG (IMPAIRED FASTING GLUCOSE): ICD-10-CM

## 2023-08-14 DIAGNOSIS — I10 ESSENTIAL HYPERTENSION: ICD-10-CM

## 2023-08-14 DIAGNOSIS — Z00.00 INITIAL MEDICARE ANNUAL WELLNESS VISIT: Primary | ICD-10-CM

## 2023-08-14 PROCEDURE — 3079F DIAST BP 80-89 MM HG: CPT | Performed by: NURSE PRACTITIONER

## 2023-08-14 PROCEDURE — 1123F ACP DISCUSS/DSCN MKR DOCD: CPT | Performed by: NURSE PRACTITIONER

## 2023-08-14 PROCEDURE — 3075F SYST BP GE 130 - 139MM HG: CPT | Performed by: NURSE PRACTITIONER

## 2023-08-14 PROCEDURE — G0438 PPPS, INITIAL VISIT: HCPCS | Performed by: NURSE PRACTITIONER

## 2023-08-14 ASSESSMENT — ENCOUNTER SYMPTOMS
ABDOMINAL PAIN: 0
VOMITING: 0
DIARRHEA: 0
FACIAL SWELLING: 0
COUGH: 0
COLOR CHANGE: 0
SINUS PAIN: 0
SORE THROAT: 0
SHORTNESS OF BREATH: 0
BACK PAIN: 0
NAUSEA: 0
TROUBLE SWALLOWING: 0
EYE PAIN: 0
WHEEZING: 0

## 2023-08-14 ASSESSMENT — PATIENT HEALTH QUESTIONNAIRE - PHQ9
SUM OF ALL RESPONSES TO PHQ QUESTIONS 1-9: 0
2. FEELING DOWN, DEPRESSED OR HOPELESS: 0
SUM OF ALL RESPONSES TO PHQ9 QUESTIONS 1 & 2: 0
SUM OF ALL RESPONSES TO PHQ QUESTIONS 1-9: 0
1. LITTLE INTEREST OR PLEASURE IN DOING THINGS: 0

## 2023-08-14 ASSESSMENT — LIFESTYLE VARIABLES
HOW MANY STANDARD DRINKS CONTAINING ALCOHOL DO YOU HAVE ON A TYPICAL DAY: 1 OR 2
HOW OFTEN DO YOU HAVE A DRINK CONTAINING ALCOHOL: 2-3 TIMES A WEEK

## 2023-08-14 NOTE — PROGRESS NOTES
Medicare Annual Wellness Visit    Cesar Batista is here for Medicare AWV (Pt would like to discuss medications because she has been having some dizziness. Pt states that she has been breaking tablets in half due to the symptoms she has been having. ), Eye Pain (Pt has been having some left eye pain for about 6 months and would like to discuss, pt does have an appt with eye doctor in 2 days as well.), and Foot Pain (Pt is having some tingling and pain in her left foot that radiates more to the top of her foot. )    Assessment & Plan   Initial Medicare annual wellness visit  Mixed hyperlipidemia  -     [facility] - Andres Pena MD, Cardiology, [place]  -     Lipid Panel; Future  Tobacco abuse  -     [facility] Carrasco MD, [facility]  Essential hypertension  -     [facility]co [facility], [facility]  -     Comprehensive Metabolic Panel; Future  -     CBC with Auto Differential; Future  -     TSH; Future  -     T4, Free; Future  IFG (impaired fasting glucose)  -     Hemoglobin A1C; Future  Advanced care planning/counseling discussion  -     [facility] Referral to ACP Clinical Specialist  Left foot pain  -     XR FOOT LEFT (MIN 3 VIEWS); Future    Recommendations for Preventive Services Due: see orders and patient instructions/AVS.  Recommended screening schedule for the next 5-10 years is provided to the patient in written form: see Patient Instructions/AVS.     Return in 6 months (on 2/14/2024), or if symptoms worsen or fail to improve, for Routine follow up, Medication check, Result discussion. Subjective       Patient's complete Health Risk Assessment and screening values have been reviewed and are found in Flowsheets. The following problems were reviewed today and where indicated follow up appointments were made and/or referrals ordered.     Positive Risk Factor Screenings with Interventions:                  Dentist Screen:  Have you seen the dentist within the past year?: (!)

## 2023-08-14 NOTE — PATIENT INSTRUCTIONS
copay.    Some of these benefits include a comprehensive review of your medical history including lifestyle, illnesses that may run in your family, and various assessments and screenings as appropriate. After reviewing your medical record and screening and assessments performed today your provider may have ordered immunizations, labs, imaging, and/or referrals for you. A list of these orders (if applicable) as well as your Preventive Care list are included within your After Visit Summary for your review. Other Preventive Recommendations:    A preventive eye exam performed by an eye specialist is recommended every 1-2 years to screen for glaucoma; cataracts, macular degeneration, and other eye disorders. A preventive dental visit is recommended every 6 months. Try to get at least 150 minutes of exercise per week or 10,000 steps per day on a pedometer . Order or download the FREE \"Exercise & Physical Activity: Your Everyday Guide\" from [employer]. Call 6-646.464.5650 or search [employer] online. You need 1943-8931 mg of calcium and 4612-5544 IU of vitamin D per day. It is possible to meet your calcium requirement with diet alone, but a vitamin D supplement is usually necessary to meet this goal.  When exposed to the sun, use a sunscreen that protects against both UVA and UVB radiation with an SPF of 30 or greater. Reapply every 2 to 3 hours or after sweating, drying off with a towel, or swimming. Always wear a seat belt when traveling in a car. Always wear a helmet when riding a bicycle or motorcycle.

## 2023-08-14 NOTE — PROGRESS NOTES
[address] [facility] [address]  Dept: [phone]  Dept Fax: [phone]  Loc: [phone]     2023     Perla Peguero (:  1955) is a 79 y.o. female, here for evaluation of the following medical concerns:    Chief Complaint   Patient presents with    Medicare AWV     Pt would like to discuss medications because she has been having some dizziness. Pt states that she has been breaking tablets in half due to the symptoms she has been having. Eye Pain     Pt has been having some left eye pain for about 6 months and would like to discuss, pt does have an appt with eye doctor in 2 days as well. Foot Pain     Pt is having some tingling and pain in her left foot that radiates more to the top of her foot. Pt presents to the office today for AWV and follow up on HTN and Hyperlipidemia. She has been having some eye pain and will see her eye doctor in 2 days. Has been dealing with dizziness on and off for a few months and it seems like this has to do with her medications. When she sits and drinks water, dizziness improves. No dizziness at this time. When she cuts her Hyzaar in half, the dizziness improves. HX of plantar fasciitis. Now top of the foot hurts. Left foot for a few months. ON and off when she has been on it for a long time. No swelling. No trauma to area. Foot Pain   The pain is present in the left foot. This is a new problem. The current episode started more than 1 month ago. There has been no history of extremity trauma. The problem occurs daily. The problem has been waxing and waning. The quality of the pain is described as aching. The pain is moderate. Pertinent negatives include no fever, inability to bear weight, itching, joint locking, joint swelling, limited range of motion, numbness, stiffness or tingling. The symptoms are aggravated by activity.  She has tried rest and NSAIDS for

## 2023-08-15 ENCOUNTER — CLINICAL DOCUMENTATION (OUTPATIENT)
Dept: SPIRITUAL SERVICES | Facility: CLINIC | Age: 68
End: 2023-08-15

## 2023-08-15 NOTE — ACP (ADVANCE CARE PLANNING)
Advance Care Planning   Ambulatory ACP Specialist Patient Outreach    Date:  8/15/2023  ACP Specialist:  Jessica Case    Outreach call to patient in follow-up to ACP Specialist referral from: Stacy Valderrama MD    [x] PCP  [] Provider   [] Ambulatory Care Management [] Other for Reason:    [x] Advance Directive Assistance  [] Code Status Discussion  [] Complete Portable DNR Order  [x] Discuss Goals of Care  [] Complete POST/MOST  [] Early ACP Decision-Making  [] Other    Date Referral Received: 8/14/2023    Today's Outreach:  [x] First   [] Second  [] Third                               Third outreach made by []  phone  [] email []   [employer]     Intervention:  [x] Spoke with Patient  [] Left VM requesting return call      Outcome:    made an initial attempt to contact [employer] via telephone call to offer support, if desired, for the completion of Advance Directives documents as part of an [address] [place] [address] conversation. Franny expressed interest in the completion of documents.  briefly explained documents for clarity and greater understanding, as well as information needed for completion. An appointment is scheduled on 8/21 at [facility]. Next Step:   [x] ACP scheduled conversation  [] Outreach again in one week               [] Email / Mail ACP Info Sheets  [] Email / Mail Advance Directive            [] Close Referral. Routing closure to referring provider/staff and to ACP Specialist . [] Closure Letter mailed to Patient with Invitation to Contact ACP Specialist if/when ready.     Thank you for this referral.

## 2023-08-21 ENCOUNTER — TELEPHONE (OUTPATIENT)
Dept: FAMILY MEDICINE CLINIC | Age: 68
End: 2023-08-21

## 2023-08-21 ENCOUNTER — CLINICAL DOCUMENTATION (OUTPATIENT)
Dept: SPIRITUAL SERVICES | Facility: CLINIC | Age: 68
End: 2023-08-21

## 2023-08-21 NOTE — TELEPHONE ENCOUNTER
Patient called to confirm that you received the consult note from her eye doctor and reviewed. (Consult note under media tab dated 8/18/23)  She has a new patient appt scheduled with cardiology tomorrow. Please call pt back if you have any recommendations regarding this.

## 2023-08-21 NOTE — TELEPHONE ENCOUNTER
Noted. It appears that hypertensive retinopathy is getting worse. Please have pt let cardiology know about the eye exam as well and keep tomorrows appt, they may adjust her medications. Follow up with our office as planned.  Thanks -WS

## 2023-08-21 NOTE — ACP (ADVANCE CARE PLANNING)
(CPR). Outcomes:  ACP Discussion: Completed  New advance directive completed. Returned original document(s) to patient, as well as copies for distribution to appointed agents  Copy of advance directive given to staff to scan into medical record. Routed ACP note to attending provider or other IDT member.     Electronically signed by Alleen Dancer, Chaplain on 8/21/2023 at 10:41 AM.

## 2023-08-22 ENCOUNTER — OFFICE VISIT (OUTPATIENT)
Dept: CARDIOLOGY CLINIC | Age: 68
End: 2023-08-22
Payer: MEDICARE

## 2023-08-22 VITALS
WEIGHT: 135.8 LBS | BODY MASS INDEX: 25.64 KG/M2 | HEART RATE: 90 BPM | DIASTOLIC BLOOD PRESSURE: 68 MMHG | SYSTOLIC BLOOD PRESSURE: 122 MMHG | HEIGHT: 61 IN

## 2023-08-22 DIAGNOSIS — I10 ESSENTIAL HYPERTENSION: Primary | ICD-10-CM

## 2023-08-22 DIAGNOSIS — Z72.0 TOBACCO ABUSE: ICD-10-CM

## 2023-08-22 DIAGNOSIS — I45.10 RIGHT BUNDLE-BRANCH BLOCK: ICD-10-CM

## 2023-08-22 DIAGNOSIS — R20.2 NUMBNESS AND TINGLING OF BOTH LOWER EXTREMITIES: ICD-10-CM

## 2023-08-22 DIAGNOSIS — H35.033 HYPERTENSIVE RETINOPATHY OF BOTH EYES: ICD-10-CM

## 2023-08-22 DIAGNOSIS — R20.0 NUMBNESS AND TINGLING OF BOTH LOWER EXTREMITIES: ICD-10-CM

## 2023-08-22 DIAGNOSIS — I73.9 CLAUDICATION (HCC): ICD-10-CM

## 2023-08-22 PROCEDURE — 99204 OFFICE O/P NEW MOD 45 MIN: CPT | Performed by: INTERNAL MEDICINE

## 2023-08-22 PROCEDURE — 1090F PRES/ABSN URINE INCON ASSESS: CPT | Performed by: INTERNAL MEDICINE

## 2023-08-22 PROCEDURE — 93000 ELECTROCARDIOGRAM COMPLETE: CPT | Performed by: INTERNAL MEDICINE

## 2023-08-22 PROCEDURE — 3017F COLORECTAL CA SCREEN DOC REV: CPT | Performed by: INTERNAL MEDICINE

## 2023-08-22 PROCEDURE — 1123F ACP DISCUSS/DSCN MKR DOCD: CPT | Performed by: INTERNAL MEDICINE

## 2023-08-22 PROCEDURE — 4004F PT TOBACCO SCREEN RCVD TLK: CPT | Performed by: INTERNAL MEDICINE

## 2023-08-22 PROCEDURE — G8417 CALC BMI ABV UP PARAM F/U: HCPCS | Performed by: INTERNAL MEDICINE

## 2023-08-22 PROCEDURE — 3074F SYST BP LT 130 MM HG: CPT | Performed by: INTERNAL MEDICINE

## 2023-08-22 PROCEDURE — G8427 DOCREV CUR MEDS BY ELIG CLIN: HCPCS | Performed by: INTERNAL MEDICINE

## 2023-08-22 PROCEDURE — G8400 PT W/DXA NO RESULTS DOC: HCPCS | Performed by: INTERNAL MEDICINE

## 2023-08-22 PROCEDURE — 3078F DIAST BP <80 MM HG: CPT | Performed by: INTERNAL MEDICINE

## 2023-08-22 RX ORDER — LOSARTAN POTASSIUM 100 MG/1
100 TABLET ORAL DAILY
Qty: 90 TABLET | Refills: 1 | Status: SHIPPED | OUTPATIENT
Start: 2023-08-22

## 2023-08-24 ENCOUNTER — HOSPITAL ENCOUNTER (OUTPATIENT)
Dept: NON INVASIVE DIAGNOSTICS | Age: 68
Discharge: HOME | End: 2023-08-24
Attending: INTERNAL MEDICINE
Payer: MEDICARE

## 2023-08-24 ENCOUNTER — HOSPITAL ENCOUNTER (OUTPATIENT)
Dept: INTERVENTIONAL RADIOLOGY/VASCULAR | Age: 68
Discharge: HOME | End: 2023-08-24
Attending: INTERNAL MEDICINE
Payer: MEDICARE

## 2023-08-24 ENCOUNTER — APPOINTMENT (OUTPATIENT)
Dept: NON INVASIVE DIAGNOSTICS | Age: 68
End: 2023-08-24
Attending: INTERNAL MEDICINE
Payer: MEDICARE

## 2023-08-24 DIAGNOSIS — I45.10 RIGHT BUNDLE-BRANCH BLOCK: ICD-10-CM

## 2023-08-24 DIAGNOSIS — I10 ESSENTIAL HYPERTENSION: ICD-10-CM

## 2023-08-24 DIAGNOSIS — H35.033 HYPERTENSIVE RETINOPATHY OF BOTH EYES: ICD-10-CM

## 2023-08-24 DIAGNOSIS — E78.2 MIXED HYPERLIPIDEMIA: ICD-10-CM

## 2023-08-24 DIAGNOSIS — Z72.0 TOBACCO ABUSE: ICD-10-CM

## 2023-08-24 DIAGNOSIS — R20.0 NUMBNESS AND TINGLING OF BOTH LOWER EXTREMITIES: ICD-10-CM

## 2023-08-24 DIAGNOSIS — R94.31 ABNORMAL EKG: ICD-10-CM

## 2023-08-24 DIAGNOSIS — I73.9 CLAUDICATION (HCC): ICD-10-CM

## 2023-08-24 DIAGNOSIS — Z86.79 HX OF RHEUMATIC FEVER: ICD-10-CM

## 2023-08-24 DIAGNOSIS — R20.2 NUMBNESS AND TINGLING OF BOTH LOWER EXTREMITIES: ICD-10-CM

## 2023-08-24 LAB
LV EF: 58 %
LVEF MODALITY: NORMAL

## 2023-08-24 PROCEDURE — 93306 TTE W/DOPPLER COMPLETE: CPT

## 2023-08-24 PROCEDURE — 93922 UPR/L XTREMITY ART 2 LEVELS: CPT

## 2023-11-10 ENCOUNTER — OFFICE VISIT (OUTPATIENT)
Dept: CARDIOLOGY CLINIC | Age: 68
End: 2023-11-10
Payer: MEDICARE

## 2023-11-10 VITALS
WEIGHT: 136 LBS | HEART RATE: 92 BPM | BODY MASS INDEX: 25.68 KG/M2 | DIASTOLIC BLOOD PRESSURE: 60 MMHG | HEIGHT: 61 IN | SYSTOLIC BLOOD PRESSURE: 128 MMHG

## 2023-11-10 DIAGNOSIS — I10 HYPERTENSION, UNSPECIFIED TYPE: Primary | ICD-10-CM

## 2023-11-10 PROCEDURE — 1123F ACP DISCUSS/DSCN MKR DOCD: CPT | Performed by: INTERNAL MEDICINE

## 2023-11-10 PROCEDURE — 4004F PT TOBACCO SCREEN RCVD TLK: CPT | Performed by: INTERNAL MEDICINE

## 2023-11-10 PROCEDURE — 3074F SYST BP LT 130 MM HG: CPT | Performed by: INTERNAL MEDICINE

## 2023-11-10 PROCEDURE — 1090F PRES/ABSN URINE INCON ASSESS: CPT | Performed by: INTERNAL MEDICINE

## 2023-11-10 PROCEDURE — 3078F DIAST BP <80 MM HG: CPT | Performed by: INTERNAL MEDICINE

## 2023-11-10 PROCEDURE — 99214 OFFICE O/P EST MOD 30 MIN: CPT | Performed by: INTERNAL MEDICINE

## 2023-11-10 PROCEDURE — G8484 FLU IMMUNIZE NO ADMIN: HCPCS | Performed by: INTERNAL MEDICINE

## 2023-11-10 PROCEDURE — G8400 PT W/DXA NO RESULTS DOC: HCPCS | Performed by: INTERNAL MEDICINE

## 2023-11-10 PROCEDURE — G8417 CALC BMI ABV UP PARAM F/U: HCPCS | Performed by: INTERNAL MEDICINE

## 2023-11-10 PROCEDURE — 3017F COLORECTAL CA SCREEN DOC REV: CPT | Performed by: INTERNAL MEDICINE

## 2023-11-10 PROCEDURE — G8427 DOCREV CUR MEDS BY ELIG CLIN: HCPCS | Performed by: INTERNAL MEDICINE

## 2023-11-10 RX ORDER — LATANOPROST 50 UG/ML
SOLUTION/ DROPS OPHTHALMIC
COMMUNITY
Start: 2023-10-24

## 2023-11-10 NOTE — PROGRESS NOTES
Pt here for 3 mo check up -    Pt continues with dizziness
stop smoking because smoking increases risk of heart disease, morbidity, mortality and end organ damage. The patient is asked to make an attempt to improve diet and exercise patterns to aid in medical management of this problem. Advised more plant based nutrition/meditarrean diet   Advised patient to call office or seek immediate medical attention if there is any new onset of  any chest pain, sob, palpitations, lightheadedness, dizziness, orthopnea, PND or pedal edema. All medication side effects were discussed in details. Thank youfor allowing me to participate in the care of this patient. Please do not hesitate to contact me for any further questions. Return if symptoms worsen or fail to improve, for Review testing, Regular follow up.        Electronically signed by Edson Dangelo MD [facility]  11/10/2023 at 8:24 AM EDT

## 2024-02-09 ENCOUNTER — NURSE ONLY (OUTPATIENT)
Dept: LAB | Age: 69
End: 2024-02-09

## 2024-02-09 DIAGNOSIS — E78.2 MIXED HYPERLIPIDEMIA: ICD-10-CM

## 2024-02-09 DIAGNOSIS — I10 ESSENTIAL HYPERTENSION: ICD-10-CM

## 2024-02-09 DIAGNOSIS — R79.9 ELEVATED BUN: ICD-10-CM

## 2024-02-09 DIAGNOSIS — R73.01 IFG (IMPAIRED FASTING GLUCOSE): ICD-10-CM

## 2024-02-09 LAB
ALBUMIN SERPL BCG-MCNC: 4.3 G/DL (ref 3.5–5.1)
ALP SERPL-CCNC: 58 U/L (ref 38–126)
ALT SERPL W/O P-5'-P-CCNC: 28 U/L (ref 11–66)
ANION GAP SERPL CALC-SCNC: 14 MEQ/L (ref 8–16)
AST SERPL-CCNC: 26 U/L (ref 5–40)
BASOPHILS ABSOLUTE: 0.1 THOU/MM3 (ref 0–0.1)
BASOPHILS NFR BLD AUTO: 0.8 %
BILIRUB SERPL-MCNC: 0.3 MG/DL (ref 0.3–1.2)
BUN SERPL-MCNC: 18 MG/DL (ref 7–22)
CALCIUM SERPL-MCNC: 9.6 MG/DL (ref 8.5–10.5)
CHLORIDE SERPL-SCNC: 99 MEQ/L (ref 98–111)
CHOLEST SERPL-MCNC: 188 MG/DL (ref 100–199)
CO2 SERPL-SCNC: 25 MEQ/L (ref 23–33)
CREAT SERPL-MCNC: 0.9 MG/DL (ref 0.4–1.2)
DEPRECATED MEAN GLUCOSE BLD GHB EST-ACNC: 129 MG/DL (ref 70–126)
DEPRECATED RDW RBC AUTO: 48.6 FL (ref 35–45)
EOSINOPHIL NFR BLD AUTO: 3.1 %
EOSINOPHILS ABSOLUTE: 0.2 THOU/MM3 (ref 0–0.4)
ERYTHROCYTE [DISTWIDTH] IN BLOOD BY AUTOMATED COUNT: 13 % (ref 11.5–14.5)
GFR SERPL CREATININE-BSD FRML MDRD: > 60 ML/MIN/1.73M2
GLUCOSE SERPL-MCNC: 109 MG/DL (ref 70–108)
HBA1C MFR BLD HPLC: 6.3 % (ref 4.4–6.4)
HCT VFR BLD AUTO: 49.6 % (ref 37–47)
HDLC SERPL-MCNC: 56 MG/DL
HGB BLD-MCNC: 16.7 GM/DL (ref 12–16)
IMM GRANULOCYTES # BLD AUTO: 0.03 THOU/MM3 (ref 0–0.07)
IMM GRANULOCYTES NFR BLD AUTO: 0.4 %
LDLC SERPL CALC-MCNC: 95 MG/DL
LYMPHOCYTES ABSOLUTE: 2.8 THOU/MM3 (ref 1–4.8)
LYMPHOCYTES NFR BLD AUTO: 37 %
MCH RBC QN AUTO: 34.2 PG (ref 26–33)
MCHC RBC AUTO-ENTMCNC: 33.7 GM/DL (ref 32.2–35.5)
MCV RBC AUTO: 101.6 FL (ref 81–99)
MONOCYTES ABSOLUTE: 0.6 THOU/MM3 (ref 0.4–1.3)
MONOCYTES NFR BLD AUTO: 7.9 %
NEUTROPHILS NFR BLD AUTO: 50.8 %
NRBC BLD AUTO-RTO: 0 /100 WBC
PLATELET # BLD AUTO: 228 THOU/MM3 (ref 130–400)
PMV BLD AUTO: 11.2 FL (ref 9.4–12.4)
POTASSIUM SERPL-SCNC: 5 MEQ/L (ref 3.5–5.2)
PROT SERPL-MCNC: 7.3 G/DL (ref 6.1–8)
RBC # BLD AUTO: 4.88 MILL/MM3 (ref 4.2–5.4)
SEGMENTED NEUTROPHILS ABSOLUTE COUNT: 3.9 THOU/MM3 (ref 1.8–7.7)
SODIUM SERPL-SCNC: 138 MEQ/L (ref 135–145)
T4 FREE SERPL-MCNC: 1 NG/DL (ref 0.93–1.76)
TRIGL SERPL-MCNC: 183 MG/DL (ref 0–199)
TSH SERPL DL<=0.005 MIU/L-ACNC: 1.78 UIU/ML (ref 0.4–4.2)
WBC # BLD AUTO: 7.7 THOU/MM3 (ref 4.8–10.8)

## 2024-02-15 ENCOUNTER — OFFICE VISIT (OUTPATIENT)
Dept: FAMILY MEDICINE CLINIC | Age: 69
End: 2024-02-15
Payer: MEDICARE

## 2024-02-15 VITALS
SYSTOLIC BLOOD PRESSURE: 124 MMHG | RESPIRATION RATE: 16 BRPM | BODY MASS INDEX: 26.26 KG/M2 | WEIGHT: 139 LBS | HEART RATE: 80 BPM | DIASTOLIC BLOOD PRESSURE: 72 MMHG | TEMPERATURE: 97.9 F

## 2024-02-15 DIAGNOSIS — Z72.0 TOBACCO ABUSE: ICD-10-CM

## 2024-02-15 DIAGNOSIS — Z12.31 ENCOUNTER FOR SCREENING MAMMOGRAM FOR MALIGNANT NEOPLASM OF BREAST: ICD-10-CM

## 2024-02-15 DIAGNOSIS — Z87.891 PERSONAL HISTORY OF TOBACCO USE: ICD-10-CM

## 2024-02-15 DIAGNOSIS — R73.01 IFG (IMPAIRED FASTING GLUCOSE): ICD-10-CM

## 2024-02-15 DIAGNOSIS — I10 ESSENTIAL HYPERTENSION: ICD-10-CM

## 2024-02-15 DIAGNOSIS — E78.2 MIXED HYPERLIPIDEMIA: Primary | ICD-10-CM

## 2024-02-15 PROCEDURE — G0296 VISIT TO DETERM LDCT ELIG: HCPCS | Performed by: FAMILY MEDICINE

## 2024-02-15 PROCEDURE — 3017F COLORECTAL CA SCREEN DOC REV: CPT | Performed by: FAMILY MEDICINE

## 2024-02-15 PROCEDURE — G8427 DOCREV CUR MEDS BY ELIG CLIN: HCPCS | Performed by: FAMILY MEDICINE

## 2024-02-15 PROCEDURE — 1123F ACP DISCUSS/DSCN MKR DOCD: CPT | Performed by: FAMILY MEDICINE

## 2024-02-15 PROCEDURE — 99214 OFFICE O/P EST MOD 30 MIN: CPT | Performed by: FAMILY MEDICINE

## 2024-02-15 PROCEDURE — 3078F DIAST BP <80 MM HG: CPT | Performed by: FAMILY MEDICINE

## 2024-02-15 PROCEDURE — 3074F SYST BP LT 130 MM HG: CPT | Performed by: FAMILY MEDICINE

## 2024-02-15 PROCEDURE — 1090F PRES/ABSN URINE INCON ASSESS: CPT | Performed by: FAMILY MEDICINE

## 2024-02-15 PROCEDURE — G8484 FLU IMMUNIZE NO ADMIN: HCPCS | Performed by: FAMILY MEDICINE

## 2024-02-15 PROCEDURE — G8400 PT W/DXA NO RESULTS DOC: HCPCS | Performed by: FAMILY MEDICINE

## 2024-02-15 PROCEDURE — 4004F PT TOBACCO SCREEN RCVD TLK: CPT | Performed by: FAMILY MEDICINE

## 2024-02-15 PROCEDURE — G8417 CALC BMI ABV UP PARAM F/U: HCPCS | Performed by: FAMILY MEDICINE

## 2024-02-15 RX ORDER — TIMOLOL MALEATE 5 MG/ML
SOLUTION/ DROPS OPHTHALMIC
COMMUNITY
Start: 2023-11-27

## 2024-02-15 RX ORDER — LOSARTAN POTASSIUM 100 MG/1
100 TABLET ORAL DAILY
Qty: 90 TABLET | Refills: 3 | Status: SHIPPED | OUTPATIENT
Start: 2024-02-15

## 2024-02-15 NOTE — PROGRESS NOTES
Scheduled patient for a CT Lung screen on 3/14/24 at 7:40 AM at [facility]. Patient notified and was agreeable.

## 2024-02-27 ENCOUNTER — HOSPITAL ENCOUNTER (OUTPATIENT)
Dept: WOMENS IMAGING | Age: 69
Discharge: HOME | End: 2024-02-27
Attending: FAMILY MEDICINE
Payer: MEDICARE

## 2024-02-27 VITALS — BODY MASS INDEX: 25.68 KG/M2 | HEIGHT: 61 IN | WEIGHT: 136 LBS

## 2024-02-27 DIAGNOSIS — Z12.31 ENCOUNTER FOR SCREENING MAMMOGRAM FOR MALIGNANT NEOPLASM OF BREAST: ICD-10-CM

## 2024-02-27 PROCEDURE — 77063 BREAST TOMOSYNTHESIS BI: CPT

## 2024-03-14 ENCOUNTER — HOSPITAL ENCOUNTER (OUTPATIENT)
Dept: CT IMAGING | Age: 69
Discharge: HOME | End: 2024-03-14
Attending: FAMILY MEDICINE
Payer: MEDICARE

## 2024-03-14 DIAGNOSIS — Z87.891 PERSONAL HISTORY OF TOBACCO USE: ICD-10-CM

## 2024-03-14 PROCEDURE — 71271 CT THORAX LUNG CANCER SCR C-: CPT

## 2024-03-18 ENCOUNTER — TELEPHONE (OUTPATIENT)
Dept: FAMILY MEDICINE CLINIC | Age: 69
End: 2024-03-18

## 2024-03-18 DIAGNOSIS — R91.8 PULMONARY NODULES: Primary | ICD-10-CM

## 2024-03-18 NOTE — TELEPHONE ENCOUNTER
----- Message from Nilda Sherman MD sent at 3/17/2024 12:04 PM EDT -----  2 pulmonary nodules noted.  PET/CT recommended for further evaluation.  Please arrange this and refer to pulmonology. CG

## 2024-03-18 NOTE — TELEPHONE ENCOUNTER
Patient notified of results and agreeable to referral and PET scan.  Call back with appt for PET scan.  Patient aware that pulmonary will contact her for scheduling.    Other

## 2024-03-18 NOTE — TELEPHONE ENCOUNTER
PET scheduled at [facility] main radiology on 4/3/24. Arrival at 1000 for 1030 scan. Patient notified. Prep for study and appt info provided to pt over the phone and mailed.

## 2024-03-22 ENCOUNTER — OFFICE VISIT (OUTPATIENT)
Dept: PULMONOLOGY | Age: 69
End: 2024-03-22
Payer: MEDICARE

## 2024-03-22 VITALS
BODY MASS INDEX: 26.58 KG/M2 | TEMPERATURE: 97.9 F | HEIGHT: 61 IN | WEIGHT: 140.8 LBS | HEART RATE: 55 BPM | OXYGEN SATURATION: 100 % | SYSTOLIC BLOOD PRESSURE: 124 MMHG | DIASTOLIC BLOOD PRESSURE: 76 MMHG

## 2024-03-22 DIAGNOSIS — R06.2 WHEEZING: ICD-10-CM

## 2024-03-22 DIAGNOSIS — F17.210 CIGARETTE SMOKER MOTIVATED TO QUIT: ICD-10-CM

## 2024-03-22 DIAGNOSIS — R91.1 INCIDENTAL LUNG NODULE, GREATER THAN OR EQUAL TO 8MM: Primary | ICD-10-CM

## 2024-03-22 DIAGNOSIS — R91.8 ABNORMAL SCREENING COMPUTED TOMOGRAPHY (CT) OF LUNG: ICD-10-CM

## 2024-03-22 PROCEDURE — 4004F PT TOBACCO SCREEN RCVD TLK: CPT | Performed by: INTERNAL MEDICINE

## 2024-03-22 PROCEDURE — 1123F ACP DISCUSS/DSCN MKR DOCD: CPT | Performed by: INTERNAL MEDICINE

## 2024-03-22 PROCEDURE — 3074F SYST BP LT 130 MM HG: CPT | Performed by: INTERNAL MEDICINE

## 2024-03-22 PROCEDURE — G8399 PT W/DXA RESULTS DOCUMENT: HCPCS | Performed by: INTERNAL MEDICINE

## 2024-03-22 PROCEDURE — G8417 CALC BMI ABV UP PARAM F/U: HCPCS | Performed by: INTERNAL MEDICINE

## 2024-03-22 PROCEDURE — G8427 DOCREV CUR MEDS BY ELIG CLIN: HCPCS | Performed by: INTERNAL MEDICINE

## 2024-03-22 PROCEDURE — 3017F COLORECTAL CA SCREEN DOC REV: CPT | Performed by: INTERNAL MEDICINE

## 2024-03-22 PROCEDURE — 1090F PRES/ABSN URINE INCON ASSESS: CPT | Performed by: INTERNAL MEDICINE

## 2024-03-22 PROCEDURE — 99204 OFFICE O/P NEW MOD 45 MIN: CPT | Performed by: INTERNAL MEDICINE

## 2024-03-22 PROCEDURE — 3078F DIAST BP <80 MM HG: CPT | Performed by: INTERNAL MEDICINE

## 2024-03-22 PROCEDURE — G8484 FLU IMMUNIZE NO ADMIN: HCPCS | Performed by: INTERNAL MEDICINE

## 2024-03-22 ASSESSMENT — ENCOUNTER SYMPTOMS
TROUBLE SWALLOWING: 0
COUGH: 1
STRIDOR: 0
APNEA: 0
CHEST TIGHTNESS: 0
WHEEZING: 1
SHORTNESS OF BREATH: 0
CHOKING: 0
VOICE CHANGE: 0
ABDOMINAL DISTENTION: 1

## 2024-03-22 NOTE — PROGRESS NOTES
Subjective:      Patient ID: Grazyna Dey is a 68 y.o. female.      CC: lung nodule      HPI    Patient is a 68-year-old heavy smoker, Dr. Sherman ordered a screening CT scan of the chest for early diagnosis of lung cancer according to guidelines and it was reported as a RADS 4A due to the presence of a 12 mm irregular soft tissue lung nodule located laterally in the left lower lobe abutting the fissure, there is another minute 3 mm left upper lobe lung nodule.  I the time of my visit a PET scan is pending.  The nodule is located along the fissure and next to the chest wall , the fissure is thickened patient denies ever having chest wall trauma, motor vehicle accident, fractured ribs        Patient is understandably apprehensive has been acquiring information online she had good questions, images and current guidelines were reviewed in the office    During my visit I noticed an audible wheeze and a \"congested\" cough    Patient goes to the [employer] often, she exercises, does water aerobics without any dyspnea, she reports chronic cough    Elif started smoking as a senior in high school approximately 40 years ago her average is 1 pack cigarettes a day, 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    Her father had lung cancer he was a smoker, [place] the [place] has never done any farming  Review of Systems   Constitutional:  Negative for fatigue and unexpected weight change.   HENT:  Negative for trouble swallowing and voice change.    Eyes:  Positive for redness and visual disturbance.   Respiratory:  Positive for cough and wheezing. Negative for apnea, choking, chest tightness, shortness of breath and stridor.    Gastrointestinal:  Positive for abdominal distention.   Endocrine: Negative.    Musculoskeletal:  Positive for

## 2024-03-27 ENCOUNTER — TELEPHONE (OUTPATIENT)
Dept: PHARMACY | Age: 69
End: 2024-03-27

## 2024-03-27 NOTE — TELEPHONE ENCOUNTER
Referral to [facility]'s Medication Management Smoking Cessation [facility] received from Dr. Maldonado for Smoking Cessation Counseling and Medication Management per Consult Agreement.      Called patient, left message with instructions for patient to call the clinic back at [phone], option 2.

## 2024-04-02 ENCOUNTER — HOSPITAL ENCOUNTER (OUTPATIENT)
Dept: PULMONOLOGY | Age: 69
Discharge: HOME | End: 2024-04-02
Attending: INTERNAL MEDICINE
Payer: MEDICARE

## 2024-04-02 DIAGNOSIS — R91.8 ABNORMAL SCREENING COMPUTED TOMOGRAPHY (CT) OF LUNG: ICD-10-CM

## 2024-04-02 DIAGNOSIS — R91.1 INCIDENTAL LUNG NODULE, GREATER THAN OR EQUAL TO 8MM: ICD-10-CM

## 2024-04-02 DIAGNOSIS — R06.2 WHEEZING: ICD-10-CM

## 2024-04-02 DIAGNOSIS — F17.210 CIGARETTE SMOKER MOTIVATED TO QUIT: ICD-10-CM

## 2024-04-02 PROCEDURE — 94726 PLETHYSMOGRAPHY LUNG VOLUMES: CPT

## 2024-04-02 PROCEDURE — 94729 DIFFUSING CAPACITY: CPT

## 2024-04-02 PROCEDURE — 94060 EVALUATION OF WHEEZING: CPT

## 2024-04-03 ENCOUNTER — HOSPITAL ENCOUNTER (OUTPATIENT)
Dept: PET IMAGING | Age: 69
Discharge: HOME | End: 2024-04-03
Attending: FAMILY MEDICINE
Payer: MEDICARE

## 2024-04-03 DIAGNOSIS — R91.8 PULMONARY NODULES: ICD-10-CM

## 2024-04-03 PROCEDURE — A9609 HC RX DIAGNOSTIC RADIOPHARMACEUTICAL: HCPCS | Performed by: FAMILY MEDICINE

## 2024-04-03 PROCEDURE — 78815 PET IMAGE W/CT SKULL-THIGH: CPT

## 2024-04-03 PROCEDURE — 3430000000 HC RX DIAGNOSTIC RADIOPHARMACEUTICAL: Performed by: FAMILY MEDICINE

## 2024-04-03 RX ORDER — FLUDEOXYGLUCOSE F 18 200 MCI/ML
15.4 INJECTION, SOLUTION INTRAVENOUS
Status: COMPLETED | OUTPATIENT
Start: 2024-04-03 | End: 2024-04-03

## 2024-04-03 RX ADMIN — FLUDEOXYGLUCOSE F 18 15.4 MILLICURIE: 200 INJECTION, SOLUTION INTRAVENOUS at 10:20

## 2024-04-09 ENCOUNTER — TELEPHONE (OUTPATIENT)
Dept: FAMILY MEDICINE CLINIC | Age: 69
End: 2024-04-09

## 2024-04-09 NOTE — TELEPHONE ENCOUNTER
----- Message from Jacquelyn Estrella sent at 4/9/2024  1:36 PM EDT -----  Regarding: ECC Results Request  ECC Results Request    Which lab or imaging result is the patient calling about: Pet scan     Which provider ordered the test? Nilda Sherman     Was this a Non-[facility] Provider: Yes    Date the test was preformed (PADMINI/ALBERTO/YYYY): 04/03/2024  --------------------------------------------------------------------------------------------------------------------------    Relationship to Patient: Self     Call Back Info: OK to leave message on voicemail  Preferred Call Back Number: Phone [phone]

## 2024-04-09 NOTE — TELEPHONE ENCOUNTER
CG's review is as follows:    Please forward the result to pulmonology, who the patient is currently seeing for this issue.  She has follow up with them soon. CG (copied from result notes)    Patient has appointment with pulmonary tomorrow at 9 am. Results will be discussed with her at that time. CG aware that our office is allowing specialist to go over results and answer any questions patient may have.

## 2024-04-10 ENCOUNTER — TELEPHONE (OUTPATIENT)
Dept: PHARMACY | Age: 69
End: 2024-04-10

## 2024-04-10 ENCOUNTER — OFFICE VISIT (OUTPATIENT)
Dept: PULMONOLOGY | Age: 69
End: 2024-04-10
Payer: MEDICARE

## 2024-04-10 VITALS
DIASTOLIC BLOOD PRESSURE: 82 MMHG | BODY MASS INDEX: 26.96 KG/M2 | HEIGHT: 61 IN | SYSTOLIC BLOOD PRESSURE: 132 MMHG | TEMPERATURE: 98 F | WEIGHT: 142.8 LBS | HEART RATE: 78 BPM | OXYGEN SATURATION: 100 %

## 2024-04-10 DIAGNOSIS — F17.200 SMOKER: ICD-10-CM

## 2024-04-10 DIAGNOSIS — R91.1 LUNG NODULE SEEN ON IMAGING STUDY: ICD-10-CM

## 2024-04-10 DIAGNOSIS — R59.0 LAD (LYMPHADENOPATHY), MEDIASTINAL: Primary | ICD-10-CM

## 2024-04-10 DIAGNOSIS — R94.8 ABNORMAL PET SCAN OF MEDIASTINUM: ICD-10-CM

## 2024-04-10 DIAGNOSIS — R94.2 ABNORMAL PET OF LEFT LUNG: ICD-10-CM

## 2024-04-10 DIAGNOSIS — J44.9 STAGE 1 MILD COPD BY GOLD CLASSIFICATION (HCC): ICD-10-CM

## 2024-04-10 DIAGNOSIS — J41.0 SIMPLE CHRONIC BRONCHITIS (HCC): ICD-10-CM

## 2024-04-10 PROCEDURE — 1090F PRES/ABSN URINE INCON ASSESS: CPT | Performed by: INTERNAL MEDICINE

## 2024-04-10 PROCEDURE — 3023F SPIROM DOC REV: CPT | Performed by: INTERNAL MEDICINE

## 2024-04-10 PROCEDURE — G8417 CALC BMI ABV UP PARAM F/U: HCPCS | Performed by: INTERNAL MEDICINE

## 2024-04-10 PROCEDURE — 3079F DIAST BP 80-89 MM HG: CPT | Performed by: INTERNAL MEDICINE

## 2024-04-10 PROCEDURE — 3017F COLORECTAL CA SCREEN DOC REV: CPT | Performed by: INTERNAL MEDICINE

## 2024-04-10 PROCEDURE — G8427 DOCREV CUR MEDS BY ELIG CLIN: HCPCS | Performed by: INTERNAL MEDICINE

## 2024-04-10 PROCEDURE — 1123F ACP DISCUSS/DSCN MKR DOCD: CPT | Performed by: INTERNAL MEDICINE

## 2024-04-10 PROCEDURE — 99214 OFFICE O/P EST MOD 30 MIN: CPT | Performed by: INTERNAL MEDICINE

## 2024-04-10 PROCEDURE — 3075F SYST BP GE 130 - 139MM HG: CPT | Performed by: INTERNAL MEDICINE

## 2024-04-10 PROCEDURE — G8399 PT W/DXA RESULTS DOCUMENT: HCPCS | Performed by: INTERNAL MEDICINE

## 2024-04-10 PROCEDURE — 4004F PT TOBACCO SCREEN RCVD TLK: CPT | Performed by: INTERNAL MEDICINE

## 2024-04-10 PROCEDURE — 99406 BEHAV CHNG SMOKING 3-10 MIN: CPT | Performed by: INTERNAL MEDICINE

## 2024-04-10 RX ORDER — PREDNISONE 20 MG/1
20 TABLET ORAL 2 TIMES DAILY
Qty: 10 TABLET | Refills: 0 | Status: SHIPPED | OUTPATIENT
Start: 2024-04-10 | End: 2024-04-15

## 2024-04-10 RX ORDER — DOXYCYCLINE HYCLATE 100 MG
100 TABLET ORAL 2 TIMES DAILY
Qty: 10 TABLET | Refills: 0 | Status: SHIPPED | OUTPATIENT
Start: 2024-04-10 | End: 2024-04-15

## 2024-04-10 ASSESSMENT — ENCOUNTER SYMPTOMS
CHEST TIGHTNESS: 0
SHORTNESS OF BREATH: 0
TROUBLE SWALLOWING: 0
STRIDOR: 0
VOICE CHANGE: 0
CHOKING: 0
WHEEZING: 1
APNEA: 0
COUGH: 1
ABDOMINAL DISTENTION: 0

## 2024-04-10 NOTE — PROGRESS NOTES
Neck Circumference -  14   Mallampati - 1    Lung Nodule Screening     [x] Qualifies    [] Does not qualify   [] Declined    [] Completed  
following plan was agreed upon: patient will try the following tobacco cessation strategies:  tobacco cessation classes/support groups.  She was provided with a list of local tobacco cessation resources. Provider spent 10 minutes counseling patient.        Orders Placed This Encounter   Medications    doxycycline hyclate (VIBRA-TABS) 100 MG tablet     Sig: Take 1 tablet by mouth 2 times daily for 5 days     Dispense:  10 tablet     Refill:  0    predniSONE (DELTASONE) 20 MG tablet     Sig: Take 1 tablet by mouth 2 times daily for 5 days     Dispense:  10 tablet     Refill:  0        Orders Placed This Encounter   Procedures    EKG 12 lead     Standing Status:   Future     Standing Expiration Date:   6/9/2024     Order Specific Question:   Reason for Exam?     Answer:   Pre-op    PA BRNCC EBUS GUIDED SAMPL 1/2 NODE STATION/STRUX     Standing Status:   Future     Standing Expiration Date:   4/10/2025          RTC 3 weeks

## 2024-04-10 NOTE — TELEPHONE ENCOUNTER
Pt called at this time to say that she needs to cancel appt on 4/17 for smoking cessation.  PT states she will call us back to reschedule.  I did ask pt if we don't hear from her if it would be OK if we call her back in a few weeks and she said that would be great.

## 2024-04-15 RX ORDER — SODIUM CHLORIDE 0.9 % (FLUSH) 0.9 %
5-40 SYRINGE (ML) INJECTION PRN
Status: DISCONTINUED | OUTPATIENT
Start: 2024-04-15 | End: 2024-04-18 | Stop reason: HOSPADM

## 2024-04-15 RX ORDER — SODIUM CHLORIDE 9 MG/ML
25 INJECTION, SOLUTION INTRAVENOUS PRN
Status: DISCONTINUED | OUTPATIENT
Start: 2024-04-15 | End: 2024-04-18 | Stop reason: HOSPADM

## 2024-04-15 RX ORDER — SODIUM CHLORIDE 0.9 % (FLUSH) 0.9 %
5-40 SYRINGE (ML) INJECTION EVERY 12 HOURS SCHEDULED
Status: DISCONTINUED | OUTPATIENT
Start: 2024-04-15 | End: 2024-04-18 | Stop reason: HOSPADM

## 2024-04-15 RX ORDER — SODIUM CHLORIDE 9 MG/ML
INJECTION, SOLUTION INTRAVENOUS CONTINUOUS
Status: DISCONTINUED | OUTPATIENT
Start: 2024-04-15 | End: 2024-04-18 | Stop reason: HOSPADM

## 2024-04-18 ENCOUNTER — HOSPITAL ENCOUNTER (OUTPATIENT)
Age: 69
Setting detail: OUTPATIENT SURGERY
Discharge: HOME | End: 2024-04-18
Attending: INTERNAL MEDICINE | Admitting: INTERNAL MEDICINE
Payer: MEDICARE

## 2024-04-18 ENCOUNTER — APPOINTMENT (OUTPATIENT)
Dept: GENERAL RADIOLOGY | Age: 69
End: 2024-04-18
Attending: INTERNAL MEDICINE
Payer: MEDICARE

## 2024-04-18 ENCOUNTER — ANESTHESIA EVENT (OUTPATIENT)
Dept: ENDOSCOPY | Age: 69
End: 2024-04-18
Payer: MEDICARE

## 2024-04-18 ENCOUNTER — ANESTHESIA (OUTPATIENT)
Dept: ENDOSCOPY | Age: 69
End: 2024-04-18
Payer: MEDICARE

## 2024-04-18 VITALS
DIASTOLIC BLOOD PRESSURE: 68 MMHG | OXYGEN SATURATION: 98 % | RESPIRATION RATE: 15 BRPM | BODY MASS INDEX: 26.13 KG/M2 | SYSTOLIC BLOOD PRESSURE: 158 MMHG | HEART RATE: 75 BPM | HEIGHT: 61 IN | WEIGHT: 138.4 LBS | TEMPERATURE: 97.7 F

## 2024-04-18 PROCEDURE — 7100000011 HC PHASE II RECOVERY - ADDTL 15 MIN: Performed by: INTERNAL MEDICINE

## 2024-04-18 PROCEDURE — 88172 CYTP DX EVAL FNA 1ST EA SITE: CPT

## 2024-04-18 PROCEDURE — 7100000001 HC PACU RECOVERY - ADDTL 15 MIN: Performed by: INTERNAL MEDICINE

## 2024-04-18 PROCEDURE — 88305 TISSUE EXAM BY PATHOLOGIST: CPT

## 2024-04-18 PROCEDURE — 3700000000 HC ANESTHESIA ATTENDED CARE: Performed by: INTERNAL MEDICINE

## 2024-04-18 PROCEDURE — 31652 BRONCH EBUS SAMPLNG 1/2 NODE: CPT | Performed by: INTERNAL MEDICINE

## 2024-04-18 PROCEDURE — 7100000000 HC PACU RECOVERY - FIRST 15 MIN: Performed by: INTERNAL MEDICINE

## 2024-04-18 PROCEDURE — 2580000003 HC RX 258: Performed by: INTERNAL MEDICINE

## 2024-04-18 PROCEDURE — 71045 X-RAY EXAM CHEST 1 VIEW: CPT

## 2024-04-18 PROCEDURE — 2500000003 HC RX 250 WO HCPCS: Performed by: NURSE ANESTHETIST, CERTIFIED REGISTERED

## 2024-04-18 PROCEDURE — 6360000002 HC RX W HCPCS: Performed by: NURSE ANESTHETIST, CERTIFIED REGISTERED

## 2024-04-18 PROCEDURE — 88173 CYTOPATH EVAL FNA REPORT: CPT

## 2024-04-18 PROCEDURE — 3603165200 HC BRNCHSC EBUS GUIDED SAMPL 1/2 NODE STATION/STRUX: Performed by: INTERNAL MEDICINE

## 2024-04-18 PROCEDURE — 2720000010 HC SURG SUPPLY STERILE: Performed by: INTERNAL MEDICINE

## 2024-04-18 PROCEDURE — 88177 CYTP FNA EVAL EA ADDL: CPT

## 2024-04-18 PROCEDURE — 3700000001 HC ADD 15 MINUTES (ANESTHESIA): Performed by: INTERNAL MEDICINE

## 2024-04-18 PROCEDURE — 7100000010 HC PHASE II RECOVERY - FIRST 15 MIN: Performed by: INTERNAL MEDICINE

## 2024-04-18 RX ORDER — ONDANSETRON 2 MG/ML
INJECTION INTRAMUSCULAR; INTRAVENOUS PRN
Status: DISCONTINUED | OUTPATIENT
Start: 2024-04-18 | End: 2024-04-18 | Stop reason: SDUPTHER

## 2024-04-18 RX ORDER — DEXAMETHASONE SODIUM PHOSPHATE 4 MG/ML
INJECTION, SOLUTION INTRA-ARTICULAR; INTRALESIONAL; INTRAMUSCULAR; INTRAVENOUS; SOFT TISSUE PRN
Status: DISCONTINUED | OUTPATIENT
Start: 2024-04-18 | End: 2024-04-18 | Stop reason: SDUPTHER

## 2024-04-18 RX ORDER — PROPOFOL 10 MG/ML
INJECTION, EMULSION INTRAVENOUS PRN
Status: DISCONTINUED | OUTPATIENT
Start: 2024-04-18 | End: 2024-04-18 | Stop reason: SDUPTHER

## 2024-04-18 RX ORDER — FENTANYL CITRATE 50 UG/ML
INJECTION, SOLUTION INTRAMUSCULAR; INTRAVENOUS PRN
Status: DISCONTINUED | OUTPATIENT
Start: 2024-04-18 | End: 2024-04-18 | Stop reason: SDUPTHER

## 2024-04-18 RX ORDER — LIDOCAINE HYDROCHLORIDE 20 MG/ML
INJECTION, SOLUTION EPIDURAL; INFILTRATION; INTRACAUDAL; PERINEURAL PRN
Status: DISCONTINUED | OUTPATIENT
Start: 2024-04-18 | End: 2024-04-18 | Stop reason: SDUPTHER

## 2024-04-18 RX ORDER — PHENYLEPHRINE HYDROCHLORIDE 10 MG/ML
INJECTION INTRAVENOUS PRN
Status: DISCONTINUED | OUTPATIENT
Start: 2024-04-18 | End: 2024-04-18 | Stop reason: SDUPTHER

## 2024-04-18 RX ORDER — ROCURONIUM BROMIDE 10 MG/ML
INJECTION, SOLUTION INTRAVENOUS PRN
Status: DISCONTINUED | OUTPATIENT
Start: 2024-04-18 | End: 2024-04-18 | Stop reason: SDUPTHER

## 2024-04-18 RX ORDER — MIDAZOLAM HYDROCHLORIDE 1 MG/ML
INJECTION INTRAMUSCULAR; INTRAVENOUS PRN
Status: DISCONTINUED | OUTPATIENT
Start: 2024-04-18 | End: 2024-04-18 | Stop reason: SDUPTHER

## 2024-04-18 RX ADMIN — PHENYLEPHRINE HYDROCHLORIDE 100 MCG: 10 INJECTION INTRAVENOUS at 07:53

## 2024-04-18 RX ADMIN — SODIUM CHLORIDE: 9 INJECTION, SOLUTION INTRAVENOUS at 06:38

## 2024-04-18 RX ADMIN — PHENYLEPHRINE HYDROCHLORIDE 200 MCG: 10 INJECTION INTRAVENOUS at 08:01

## 2024-04-18 RX ADMIN — LIDOCAINE HYDROCHLORIDE 60 MG: 20 INJECTION, SOLUTION EPIDURAL; INFILTRATION; INTRACAUDAL; PERINEURAL at 07:28

## 2024-04-18 RX ADMIN — PHENYLEPHRINE HYDROCHLORIDE 200 MCG: 10 INJECTION INTRAVENOUS at 07:41

## 2024-04-18 RX ADMIN — DEXAMETHASONE SODIUM PHOSPHATE 8 MG: 4 INJECTION, SOLUTION INTRAMUSCULAR; INTRAVENOUS at 07:28

## 2024-04-18 RX ADMIN — SUGAMMADEX 200 MG: 100 INJECTION, SOLUTION INTRAVENOUS at 08:10

## 2024-04-18 RX ADMIN — ROCURONIUM BROMIDE 50 MG: 10 INJECTION INTRAVENOUS at 07:28

## 2024-04-18 RX ADMIN — MIDAZOLAM 2 MG: 1 INJECTION INTRAMUSCULAR; INTRAVENOUS at 07:28

## 2024-04-18 RX ADMIN — FENTANYL CITRATE 100 MCG: 50 INJECTION, SOLUTION INTRAMUSCULAR; INTRAVENOUS at 07:28

## 2024-04-18 RX ADMIN — PROPOFOL 150 MG: 10 INJECTION, EMULSION INTRAVENOUS at 07:28

## 2024-04-18 RX ADMIN — ONDANSETRON 4 MG: 2 INJECTION INTRAMUSCULAR; INTRAVENOUS at 07:28

## 2024-04-18 ASSESSMENT — PAIN - FUNCTIONAL ASSESSMENT
PAIN_FUNCTIONAL_ASSESSMENT: FACE, LEGS, ACTIVITY, CRY, AND CONSOLABILITY (FLACC)
PAIN_FUNCTIONAL_ASSESSMENT: NONE - DENIES PAIN

## 2024-04-18 ASSESSMENT — ENCOUNTER SYMPTOMS
STRIDOR: 0
VOICE CHANGE: 0
APNEA: 0
COUGH: 0
SHORTNESS OF BREATH: 0
TROUBLE SWALLOWING: 0
ABDOMINAL DISTENTION: 0
WHEEZING: 0
CHEST TIGHTNESS: 0
CHOKING: 0

## 2024-04-18 NOTE — ANESTHESIA POSTPROCEDURE EVALUATION
Department of Anesthesiology  Postprocedure Note    Patient: Grazyna Dey  MRN: [id number]  YOB: 1955  Date of evaluation: 4/18/2024    Procedure Summary       Date: 04/18/24 Room / Location: [place] [address] / [facility]    Anesthesia Start: 0712 Anesthesia Stop: 0828    Procedure: EBUS Diagnosis:       LAD (lymphadenopathy), mediastinal      (LAD (lymphadenopathy), mediastinal [R59.0])    Surgeons: Bertin Maldonado MD Responsible Provider: Mando Dixon MD    Anesthesia Type: general ASA Status: 2            Anesthesia Type: No value filed.    Luana Phase I: Luana Score: 10    Luana Phase II: Luana Score: 10    Anesthesia Post Evaluation    Patient location during evaluation: PACU  Patient participation: complete - patient participated  Level of consciousness: awake and alert  Airway patency: patent  Nausea & Vomiting: no nausea and no vomiting  Cardiovascular status: hemodynamically stable  Respiratory status: acceptable  Hydration status: euvolemic  Pain management: adequate    [facility]  POST-ANESTHESIA NOTE       Name:  Grazyna Dey                                         Age:  68 y.o.  MRN:  [id number]      Last Vitals:  BP (!) 158/68   Pulse 75   Temp 97.7 °F (36.5 °C) (Temporal)   Resp 15   Ht 1.549 m (5' 1\")   Wt 62.8 kg (138 lb 6.4 oz)   SpO2 98%   BMI 26.15 kg/m²   Patient Vitals for the past 4 hrs:   BP Temp Temp src Pulse Resp SpO2 Height Weight   04/18/24 0922 (!) 158/68 -- -- 75 15 98 % -- --   04/18/24 0913 (!) 151/69 -- -- 74 16 97 % -- --   04/18/24 0903 138/69 -- -- 79 15 96 % -- --   04/18/24 0858 132/69 97.7 °F (36.5 °C) Temporal 79 16 96 % -- --   04/18/24 0850 132/74 -- -- 79 17 95 % -- --   04/18/24 0845 139/69 -- -- 84 16 94 % -- --   04/18/24 0840 (!) 138/94 -- -- 82 14 96 % -- --   04/18/24 0835 116/60 -- -- 90 15 95 % -- --   04/18/24 0830 116/62 -- -- 79 13 100 % -- --   04/18/24 0824 116/72 97.8 °F (36.6

## 2024-04-18 NOTE — ANESTHESIA PRE PROCEDURE
Shoulder joint pain M25.519    Mixed hyperlipidemia E78.2       Past Medical History:        Diagnosis Date    Glaucoma     right eye    Hypertension     Mixed hyperlipidemia 02/07/2023    Osteoarthritis of left glenohumeral joint 01/23/2023    Rheumatic fever age 12    Tobacco abuse        Past Surgical History:        Procedure Laterality Date    COLONOSCOPY  2010    SHOULDER ARTHROSCOPY Left 09/07/2018    TONSILLECTOMY      TUBAL LIGATION Bilateral 1987       Social History:    Social History     Tobacco Use    Smoking status: Every Day     Current packs/day: 1.00     Average packs/day: 1 pack/day for 44.3 years (44.3 ttl pk-yrs)     Types: Cigarettes     Start date: 1980    Smokeless tobacco: Never    Tobacco comments:     1 ppd for the past 44 years 03/22/2024   Substance Use Topics    Alcohol use: Yes     Comment: occ                                Ready to quit: Not Answered  Counseling given: Not Answered  Tobacco comments: 1 ppd for the past 44 years 03/22/2024      Vital Signs (Current):   Vitals:    04/18/24 0626 04/18/24 0633   BP: (!) 183/78 (!) 156/73   Pulse: 84    Resp: 15    Temp: 97.4 °F (36.3 °C)    TempSrc: Temporal    SpO2: 97%    Weight: 62.8 kg (138 lb 6.4 oz)    Height: 1.549 m (5' 1\")                                               BP Readings from Last 3 Encounters:   04/18/24 (!) 156/73   04/10/24 132/82   03/22/24 124/76       NPO Status: Time of last liquid consumption: 0500                        Time of last solid consumption: 1700                        Date of last liquid consumption: 04/18/24                        Date of last solid food consumption: 04/17/24    BMI:   Wt Readings from Last 3 Encounters:   04/18/24 62.8 kg (138 lb 6.4 oz)   04/10/24 64.8 kg (142 lb 12.8 oz)   03/22/24 63.9 kg (140 lb 12.8 oz)     Body mass index is 26.15 kg/m².    CBC:   Lab Results   Component Value Date/Time    WBC 7.7 02/09/2024 08:31 AM    RBC 4.88 02/09/2024 08:31 AM    HGB 16.7 02/09/2024 08:31

## 2024-04-18 NOTE — PROGRESS NOTES
EBUS completed, pt tolerated well. Photos taken. FNA to 10L lymph node X 7. 1 specimen jar and slides taken to lab per pathologist. Scope .

## 2024-04-18 NOTE — PROGRESS NOTES
0824  - pt arrives to pacu, respirations unlabored on 10 L NRB mask, opa in place, pt unresponsive post anesthesia, VSS    0835- pt given ice chips, pt denies pain    0850 - xray at bedside    0854 - pt meets criteria for discharge from pacu at this time, pt transported to [facility] in stable condition

## 2024-04-18 NOTE — H&P
Subjective:      Patient ID: Grazyna Dey is a 68 y.o. female.      CC: To review PET/CT      HPI      Patient comes for elective EBUS bronchoscopy due to a PET/CT positive L hilar LN and 1.3 cm L lung nodule malignant potential of the 1.3 cm left lung nodule.  Much better after introducing Trelegy, no wheezing or cough now.    PET/CT completed 4/3/2024 reveals day 1.3 cm nodule in the left midlung with increased FDG avidity with a maximum SUV of 4, a non-enlarged left hilar lymph node has a maximum SUV of 2.8 there is no FDG avid mediastinal right hilar or axillary lymphadenopathy.  Impression: #1 FDG avid left lung nodule highly suspicious for malignancy.  #2 FDG avid left hilar lymphadenopathy highly suspicious for metastatic disease.    Pulmonary function test completed 4/2/2024 reveals a postbronchodilator FEV1 of 81%, FEV1/FVC ratio 72, TLC 95%, DLCO 75%.    Patient continues smoking, coughing, producing yellowish sputum, chest tightness and dyspnea on exertion.      Previous notes  Patient is a 68-year-old heavy smoker, Dr. Sherman ordered a screening CT scan of the chest for early diagnosis of lung cancer according to guidelines and it was reported as a RADS 4A due to the presence of a 12 mm irregular soft tissue lung nodule located laterally in the left lower lobe abutting the fissure, there is another minute 3 mm left upper lobe lung nodule.  I the time of my visit a PET scan is pending.  The nodule is located along the fissure and next to the chest wall , the fissure is thickened patient denies ever having chest wall trauma, motor vehicle accident, fractured ribs        Patient is understandably apprehensive has been acquiring information online she had good questions, images and current guidelines were reviewed in the office    During my visit I noticed an audible wheeze and a \"congested\" cough    Patient goes to the [employer] often, she exercises, does water aerobics without any dyspnea,      Screening CT scan of the chest:    FINDINGS:  LUNGS NODULES:  A 2 mm pulmonary nodule is in the left upper lobe on image #76.     A 13 mm pulmonary nodules in the peripheral aspect of the left lower lobes abutting the fissure.     LYMPHADENOPATHY:  There are no pathologically enlarged lymph nodes in the mediastinum, hilar or axillary regions.     OTHER (LUNGS/MEDIASTINUM/MUSCULOSKELETAL/ABDOMEN):  The heart is normal in size. There are coronary artery calcifications. There is no pericardial or pleural effusion. There is no pneumothorax. Some atherosclerosis of the thoracic aorta. Some degenerative changes are in the spine. The liver has a somewhat   hypoattenuated appearance which could be related to fatty infiltration.     IMPRESSION:  1. There is a 13 mm pulmonary nodule in the left lower lobe abutting the fissure. PET/CT is recommended for further characterization.  2. There is a 2 mm pulmonary nodule in the left upper lobe.  3. There are no pathologically enlarged lymph nodes.  4. LUNGRADS ASSESSMENT VALUE: 4A    PFTs:          Assessment / Plan:       Diagnosis Orders   1. LAD (lymphadenopathy), mediastinal  RI BRNCHSC EBUS GUIDED SAMPL 1/2 NODE STATION/STRUX    EKG 12 lead      2. Lung nodule seen on imaging study  RI BRNCHSC EBUS GUIDED SAMPL 1/2 NODE STATION/STRUX    EKG 12 lead      3. Smoker  RI BRNCHSC EBUS GUIDED SAMPL 1/2 NODE STATION/STRUX    EKG 12 lead      4. Simple chronic bronchitis (HCC)  RI BRNCHSC EBUS GUIDED SAMPL 1/2 NODE STATION/STRUX    EKG 12 lead      5. Stage 1 mild COPD by GOLD classification (HCC)  RI BRNCHSC EBUS GUIDED SAMPL 1/2 NODE STATION/STRUX    EKG 12 lead      6. Abnormal PET of left lung        7. Abnormal PET scan of mediastinum            Proceed with EBUS bronchoscopy, benefits and possible complications discussed including bleeding, pneumothorax, respiratory failure.

## 2024-04-18 NOTE — PROGRESS NOTES
Recovery mode, pt denies discomfort. Taking in fluids. Dr. Maldonado discussed findings with responsible party. Discharge instructions provided and understanding verbalized.

## 2024-04-18 NOTE — PROGRESS NOTES
Pt admitted to Endo department and admitted to Endo room 12  Plan of care reviewed with patient.   Call light within reach.   Bed in lowest position, locked, with one bed rail up.   Appropriate arm bands on patient.   Bathroom offered.   All questions and concerns of patient addressed    Name: Tanika  Relationship to patient: Daughter  Phone number: [phone]

## 2024-05-01 ENCOUNTER — OFFICE VISIT (OUTPATIENT)
Dept: PULMONOLOGY | Age: 69
End: 2024-05-01
Payer: MEDICARE

## 2024-05-01 VITALS
TEMPERATURE: 97.8 F | HEIGHT: 61 IN | SYSTOLIC BLOOD PRESSURE: 120 MMHG | OXYGEN SATURATION: 99 % | DIASTOLIC BLOOD PRESSURE: 82 MMHG | HEART RATE: 86 BPM | BODY MASS INDEX: 26.81 KG/M2 | WEIGHT: 142 LBS

## 2024-05-01 DIAGNOSIS — R94.2 ABNORMAL PET OF LEFT LUNG: ICD-10-CM

## 2024-05-01 DIAGNOSIS — R91.1 NODULE OF LOWER LOBE OF LEFT LUNG: Primary | ICD-10-CM

## 2024-05-01 DIAGNOSIS — J44.9 CHRONIC OBSTRUCTIVE PULMONARY DISEASE, UNSPECIFIED COPD TYPE (HCC): ICD-10-CM

## 2024-05-01 DIAGNOSIS — F17.210 CIGARETTE SMOKER MOTIVATED TO QUIT: ICD-10-CM

## 2024-05-01 PROCEDURE — 3017F COLORECTAL CA SCREEN DOC REV: CPT | Performed by: INTERNAL MEDICINE

## 2024-05-01 PROCEDURE — 3074F SYST BP LT 130 MM HG: CPT | Performed by: INTERNAL MEDICINE

## 2024-05-01 PROCEDURE — 1090F PRES/ABSN URINE INCON ASSESS: CPT | Performed by: INTERNAL MEDICINE

## 2024-05-01 PROCEDURE — G8417 CALC BMI ABV UP PARAM F/U: HCPCS | Performed by: INTERNAL MEDICINE

## 2024-05-01 PROCEDURE — 1123F ACP DISCUSS/DSCN MKR DOCD: CPT | Performed by: INTERNAL MEDICINE

## 2024-05-01 PROCEDURE — G8427 DOCREV CUR MEDS BY ELIG CLIN: HCPCS | Performed by: INTERNAL MEDICINE

## 2024-05-01 PROCEDURE — 3079F DIAST BP 80-89 MM HG: CPT | Performed by: INTERNAL MEDICINE

## 2024-05-01 PROCEDURE — 99214 OFFICE O/P EST MOD 30 MIN: CPT | Performed by: INTERNAL MEDICINE

## 2024-05-01 PROCEDURE — G8399 PT W/DXA RESULTS DOCUMENT: HCPCS | Performed by: INTERNAL MEDICINE

## 2024-05-01 PROCEDURE — 3023F SPIROM DOC REV: CPT | Performed by: INTERNAL MEDICINE

## 2024-05-01 PROCEDURE — 4004F PT TOBACCO SCREEN RCVD TLK: CPT | Performed by: INTERNAL MEDICINE

## 2024-05-01 ASSESSMENT — ENCOUNTER SYMPTOMS
VOICE CHANGE: 0
CHEST TIGHTNESS: 0
APNEA: 0
SHORTNESS OF BREATH: 0
WHEEZING: 1
CHOKING: 0
STRIDOR: 0
TROUBLE SWALLOWING: 0
ABDOMINAL DISTENTION: 0
COUGH: 1

## 2024-05-01 NOTE — PROGRESS NOTES
Subjective:      Patient ID: Grazyna Dey is a 68 y.o. female.      CC: To review PET/CT      HPI      Elif comes to review results of EBUS bronchoscopy, the results are negative for malignancy several transbronchial needle aspirations were obtained from station 10 L, they are normal lymphocytic elements and blood but no malignancy.    Patient is a stable doing well she went to the [place] for vacation she had a good time able to enjoy go to the beach without any issues, samples of Trelegy I provided last visit worked well for her with decreased chest tightness cough and improve exercise tolerance she wishes a refill.    Unfortunately patient continues smoking, she admits that follow-up appointment at the smoking cessation clinic as she went on vacation but she is planning to call them back to resume smoking cessation.    We discussed the need to sample the left lower lobe lung nodule at this time via CT-guided percutaneous biopsy, Elif agrees with the plan of care    Previous notes.    Patient comes after completing PET/CT to evaluate malignant potential of the 1.3 cm left lung  Nodule    PET/CT completed 4/3/2024 reveals day 1.3 cm nodule in the left midlung with increased FDG avidity with a maximum SUV of 4, a non-enlarged left hilar lymph node has a maximum SUV of 2.8 there is no FDG avid mediastinal right hilar or axillary lymphadenopathy.  Impression: #1 FDG avid left lung nodule highly suspicious for malignancy.  #2 FDG avid left hilar lymphadenopathy highly suspicious for metastatic disease.    Pulmonary function test completed 4/2/2024 reveals a postbronchodilator FEV1 of 81%, FEV1/FVC ratio 72, TLC 95%, DLCO 75%.    Patient continues smoking, coughing, producing yellowish sputum, chest tightness and dyspnea on exertion.      Previous notes  Patient is a 68-year-old heavy smoker, Dr. Sherman ordered a screening CT scan of the chest for early diagnosis of lung cancer according to guidelines and

## 2024-05-01 NOTE — PROGRESS NOTES
Neck Circumference -   14 inch   Mallampati - 1    Lung Nodule Screening     [x] Qualifies    [] Does not qualify   [] Declined    [] Completed

## 2024-05-06 ENCOUNTER — TELEPHONE (OUTPATIENT)
Dept: PULMONOLOGY | Age: 69
End: 2024-05-06

## 2024-05-06 NOTE — TELEPHONE ENCOUNTER
Called and spoke to Rita in Radiology. They are going to send a message for the radiologist to review the patients CT and reach out to her to schedule. Called and notified patient. She voiced understanding.

## 2024-05-06 NOTE — TELEPHONE ENCOUNTER
Patient called with questions concerning her biopsy scheduling. Please follow up with patient to advise

## 2024-05-08 ENCOUNTER — HOSPITAL ENCOUNTER (OUTPATIENT)
Dept: PHARMACY | Age: 69
Setting detail: THERAPIES SERIES
Discharge: HOME | End: 2024-05-08
Payer: MEDICARE

## 2024-05-08 PROCEDURE — 99212 OFFICE O/P EST SF 10 MIN: CPT | Performed by: PHARMACIST

## 2024-05-08 RX ORDER — NICOTINE 21 MG/24HR
1 PATCH, TRANSDERMAL 24 HOURS TRANSDERMAL DAILY
Qty: 42 PATCH | Refills: 0 | Status: SHIPPED | OUTPATIENT
Start: 2024-05-08 | End: 2024-06-19

## 2024-05-08 NOTE — PROGRESS NOTES
Medication Management   [facility]  Smoking Cessation Clinic  [phone] (phone)  [phone] (fax)    Grazyna Dey is a 68 y.o. female has been referred to our service by Dr. Maldonado for Smoking Cessation Counseling and Medication Management per Consult Agreement.  Patient acknowledges working in consult agreement with clinical pharmacist and referring physician.     Pt reports they are ready and motivated to quit.    History:  Family/friends for support: \"everyone\"  Career: retired  Home environment/smoke free zones in house: No  Past Medical history/diagnosis reviewed:  Yes  Medication list reviewed: Yes    Past Medical History:      Past Medical History:   Diagnosis Date    Glaucoma     right eye    Hypertension     Mixed hyperlipidemia 02/07/2023    Osteoarthritis of left glenohumeral joint 01/23/2023    Rheumatic fever age 12    Tobacco abuse          Scaling:  Importance level: 10  Confidence level: 6    Fagerstrom Test:    How soon after you wake up do you smoke your first cigarette?  within 5\"(3)   Do you find it difficult to refrain from smoking in places where it is forbidden, e.g., in [employer], at library, [employer]? No (1 if yes)   Which cigarette would you hate to most give up?  all others(0)     How many cigarettes per day do you smoke? 11-20(1)   Do you smoke more frequently during the first hours of waking than during the rest of the day? No (1 if yes)   Do you smoke if you are so ill that you are in bed most of the day? No (1 if yes)    Classification of Dependence:  0-2 Very Low  3-4 Low  5    Moderate  6-7 High  8-10 Very High   Score: 4, I went over the results with the pt.     Tobacco use:  Current use:  #/packs per day: .75 PPD - 15 cigs, doesn't smoke the whole cig,  Age started: 18   Years used: 50  Pack years: 50 Do you vape? no  How many times in the past have you made a serious attempt to quit smoking? twice  What was the longest period of time you were able to quit smoking? 3

## 2024-05-14 RX ORDER — NICOTINE 21 MG/24HR
1 PATCH, TRANSDERMAL 24 HOURS TRANSDERMAL DAILY
Qty: 42 PATCH | Refills: 0 | Status: SHIPPED | OUTPATIENT
Start: 2024-05-14 | End: 2024-06-25

## 2024-05-14 NOTE — PROGRESS NOTES
Prescription insurance did not cover patches.  Will fill per [employer] smoking cessation funds.    Anna Frazier, PharmD, BCPS, CACP, CTTS

## 2024-05-15 ENCOUNTER — HOSPITAL ENCOUNTER (OUTPATIENT)
Dept: GENERAL RADIOLOGY | Age: 69
Discharge: HOME | End: 2024-05-15
Payer: MEDICARE

## 2024-05-15 ENCOUNTER — HOSPITAL ENCOUNTER (OUTPATIENT)
Dept: CT IMAGING | Age: 69
Discharge: HOME | End: 2024-05-15
Payer: MEDICARE

## 2024-05-15 VITALS
SYSTOLIC BLOOD PRESSURE: 121 MMHG | RESPIRATION RATE: 18 BRPM | DIASTOLIC BLOOD PRESSURE: 51 MMHG | OXYGEN SATURATION: 96 % | HEART RATE: 90 BPM | BODY MASS INDEX: 26.64 KG/M2 | TEMPERATURE: 97 F | WEIGHT: 141 LBS

## 2024-05-15 DIAGNOSIS — J44.9 CHRONIC OBSTRUCTIVE PULMONARY DISEASE, UNSPECIFIED COPD TYPE (HCC): ICD-10-CM

## 2024-05-15 DIAGNOSIS — R94.2 ABNORMAL PET OF LEFT LUNG: ICD-10-CM

## 2024-05-15 DIAGNOSIS — F17.210 CIGARETTE SMOKER MOTIVATED TO QUIT: ICD-10-CM

## 2024-05-15 DIAGNOSIS — R91.1 NODULE OF LOWER LOBE OF LEFT LUNG: ICD-10-CM

## 2024-05-15 LAB
DEPRECATED RDW RBC AUTO: 51 FL (ref 35–45)
ERYTHROCYTE [DISTWIDTH] IN BLOOD BY AUTOMATED COUNT: 13.6 % (ref 11.5–14.5)
HCT VFR BLD AUTO: 48.6 % (ref 37–47)
HGB BLD-MCNC: 16.3 GM/DL (ref 12–16)
MCH RBC QN AUTO: 34.2 PG (ref 26–33)
MCHC RBC AUTO-ENTMCNC: 33.5 GM/DL (ref 32.2–35.5)
MCV RBC AUTO: 102.1 FL (ref 81–99)
PLATELET # BLD AUTO: 211 THOU/MM3 (ref 130–400)
PMV BLD AUTO: 10.8 FL (ref 9.4–12.4)
RBC # BLD AUTO: 4.76 MILL/MM3 (ref 4.2–5.4)
WBC # BLD AUTO: 7.6 THOU/MM3 (ref 4.8–10.8)

## 2024-05-15 PROCEDURE — 88341 IMHCHEM/IMCYTCHM EA ADD ANTB: CPT

## 2024-05-15 PROCEDURE — 32408 CORE NDL BX LNG/MED PERQ: CPT

## 2024-05-15 PROCEDURE — 71045 X-RAY EXAM CHEST 1 VIEW: CPT

## 2024-05-15 PROCEDURE — 88333 PATH CONSLTJ SURG CYTO XM 1: CPT

## 2024-05-15 PROCEDURE — 6360000002 HC RX W HCPCS: Performed by: RADIOLOGY

## 2024-05-15 PROCEDURE — 85027 COMPLETE CBC AUTOMATED: CPT

## 2024-05-15 PROCEDURE — 88334 PATH CONSLTJ SURG CYTO XM EA: CPT

## 2024-05-15 PROCEDURE — 2580000003 HC RX 258: Performed by: RADIOLOGY

## 2024-05-15 PROCEDURE — 88342 IMHCHEM/IMCYTCHM 1ST ANTB: CPT

## 2024-05-15 PROCEDURE — 88312 SPECIAL STAINS GROUP 1: CPT

## 2024-05-15 PROCEDURE — 88305 TISSUE EXAM BY PATHOLOGIST: CPT

## 2024-05-15 PROCEDURE — 6370000000 HC RX 637 (ALT 250 FOR IP): Performed by: RADIOLOGY

## 2024-05-15 RX ORDER — FENTANYL CITRATE 50 UG/ML
50 INJECTION, SOLUTION INTRAMUSCULAR; INTRAVENOUS ONCE
Status: COMPLETED | OUTPATIENT
Start: 2024-05-15 | End: 2024-05-15

## 2024-05-15 RX ORDER — MIDAZOLAM HYDROCHLORIDE 1 MG/ML
1 INJECTION INTRAMUSCULAR; INTRAVENOUS ONCE
Status: COMPLETED | OUTPATIENT
Start: 2024-05-15 | End: 2024-05-15

## 2024-05-15 RX ORDER — IBUPROFEN 200 MG
TABLET ORAL ONCE
Status: COMPLETED | OUTPATIENT
Start: 2024-05-15 | End: 2024-05-15

## 2024-05-15 RX ORDER — SODIUM CHLORIDE 450 MG/100ML
INJECTION, SOLUTION INTRAVENOUS CONTINUOUS
Status: DISCONTINUED | OUTPATIENT
Start: 2024-05-15 | End: 2024-05-16 | Stop reason: HOSPADM

## 2024-05-15 RX ADMIN — BACITRACIN ZINC, NEOMYCIN, POLYMYXIN B 1 G: 400; 3.5; 5 OINTMENT TOPICAL at 09:26

## 2024-05-15 RX ADMIN — FENTANYL CITRATE 50 MCG: 50 INJECTION INTRAMUSCULAR; INTRAVENOUS at 08:55

## 2024-05-15 RX ADMIN — SODIUM CHLORIDE: 4.5 INJECTION, SOLUTION INTRAVENOUS at 07:28

## 2024-05-15 RX ADMIN — MIDAZOLAM 1 MG: 1 INJECTION INTRAMUSCULAR; INTRAVENOUS at 08:55

## 2024-05-15 ASSESSMENT — PAIN DESCRIPTION - LOCATION
LOCATION: FLANK
LOCATION: CHEST
LOCATION: FLANK

## 2024-05-15 ASSESSMENT — PAIN SCALES - GENERAL
PAINLEVEL_OUTOF10: 0
PAINLEVEL_OUTOF10: 1
PAINLEVEL_OUTOF10: 2
PAINLEVEL_OUTOF10: 0
PAINLEVEL_OUTOF10: 1

## 2024-05-15 ASSESSMENT — PAIN DESCRIPTION - DESCRIPTORS: DESCRIPTORS: DISCOMFORT

## 2024-05-15 ASSESSMENT — PAIN - FUNCTIONAL ASSESSMENT
PAIN_FUNCTIONAL_ASSESSMENT: NONE - DENIES PAIN
PAIN_FUNCTIONAL_ASSESSMENT: ACTIVITIES ARE NOT PREVENTED

## 2024-05-15 ASSESSMENT — PAIN DESCRIPTION - PAIN TYPE: TYPE: SURGICAL PAIN

## 2024-05-15 ASSESSMENT — PAIN DESCRIPTION - ORIENTATION
ORIENTATION: LEFT

## 2024-05-15 NOTE — OP NOTE
Department of Radiology  Post Procedure Progress Note      Pre-Procedure Diagnosis:  LLL pulmonary nodule    Procedure Performed:  CT guided lung biopsy    Anesthesia: local / versed and fentanyl    Findings: successful    Immediate Complications:  None    Estimated Blood Loss: minimal    SEE DICTATED PROCEDURE NOTE FOR COMPLETE DETAILS.    Electronically signed by Walter Medina MD on 5/15/2024 at 9:51 AM

## 2024-05-15 NOTE — H&P
[facility]  Sedation/Analgesia History & Physical    Pt Name: Grazyna Dey  MRN: [id number]  YOB: 1955  Provider Performing Procedure: Walter Medina MD, MD  Primary Care Physician: Nilda Sherman MD    Formulation and discussion of sedation / procedure plans, risks, benefits, side effects and alternatives with patient and/or responsible adult completed.    PRE-PROCEDURE   DNR-CCA/DNR-CC []Yes [x]No  Brief History/Pre-Procedure Diagnosis: LLL pulmonary nodule          MEDICAL HISTORY  []CAD/Valve  []Liver Disease  []Lung Disease []Diabetes  []Hypertension []Renal Disease  [x]Additional information:       has a past medical history of Glaucoma, Hypertension, Mixed hyperlipidemia, Osteoarthritis of left glenohumeral joint, Rheumatic fever, and Tobacco abuse.    SURGICAL HISTORY   has a past surgical history that includes Tonsillectomy; Tubal ligation (Bilateral, 1987); Colonoscopy (2010); Shoulder arthroscopy (Left, 09/07/2018); and bronchoscopy (N/A, 4/18/2024).  Additional information:       ALLERGIES   Allergies as of 05/15/2024 - Fully Reviewed 05/15/2024   Allergen Reaction Noted    Codeine Itching and Hives 10/28/2020    Hydrocodone-acetaminophen Itching 03/18/2022    Vicodin [hydrocodone-acetaminophen] Hives 11/08/2013     Additional information:       MEDICATIONS   Coumadin Use Last 5 Days [x]No []Yes  Antiplatelet drug therapy use last 5 days  [x]No []Yes  Other anticoagulant use last 5 days  [x]No []Yes    Current Outpatient Medications:     nicotine (NICODERM CQ) 21 MG/24HR, Place 1 patch onto the skin daily, Disp: 42 patch, Rfl: 0    nicotine polacrilex (NICORETTE) 4 MG gum, Take 1 each by mouth every hour as needed for Smoking cessation (max 20 per day), Disp: 100 each, Rfl: 5    fluticasone-umeclidin-vilant (TRELEGY ELLIPTA) 100-62.5-25 MCG/ACT AEPB inhaler, Inhale 1 puff into the lungs daily (Patient not taking: Reported on 5/8/2024), Disp: 1 each, Rfl: 5

## 2024-05-15 NOTE — PROGRESS NOTES
Biopsy site band-aid is dry and intact.    Past Medical History:   Diagnosis Date    Alcohol abuse     Anasarca 1/28/2019    Arthropathy associated with neurological disorder 9/2/2015    Atherosclerosis     Charcot foot due to diabetes mellitus     Chronic combined systolic and diastolic heart failure 01/29/2019 1-28-19 Left VentricleModerate decreased ejection fraction at 30%. Normal left ventricular cavity size. Normal wall thickness observed. Grade I (mild) left ventricular diastolic dysfunction consistent with impaired relaxation. Normal left atrial pressure. Moderate, global hypokinesis(see wall scoring diagram). Right VentricleNormal cavity size, wall thickness and ejection fraction. Wall motion n    Chronic pancreatitis 1/28/2019    Colon polyps     approx 5 yrs ago    Coronary artery disease due to calcified coronary lesion 05/08/2015    5 stents on ASA      Diabetic polyneuropathy associated with type 2 diabetes mellitus 9/2/2015    Diverticulosis 1/28/2019    DM type 2 with diabetic peripheral neuropathy 2/4/2019    Encounter for blood transfusion     Essential hypertension 1/28/2019    Former smoker 8/26/2015    Healed ulcer of left foot on examination 6/20/2017    Hydrocele     approx 1.5 yrs ago    Hypoalbuminemia 2/4/2019    Lymphedema of both lower extremities 1/29/2019    Mixed hyperlipidemia 5/8/2015    Morbid obesity with BMI of 50.0-59.9, adult 5/8/2015    Onychomycosis of multiple toenails with type 2 diabetes mellitus and peripheral neuropathy 6/20/2017    Perianal cyst     approx 2 yrs ago    Pseudocyst of pancreas 1/28/2019 1-28-19 Liver has a cirrhotic morphology with no focal lesions.  Significant interval increase in ascites when compared to prior exam which may account for patient's abdominal distension.  Hypodense air-fluid collection along the body of the pancreas which is slightly smaller when compared to prior CT.  Findings may relate to pancreatic necrosis with pancreatic pseudocysts with infected  pseudocyst    Skin cancer     skin cancer    Sleep apnea 8/26/2015    Status post bariatric surgery 1/11/2016    Type 2 diabetes mellitus, with long-term current use of insulin 5/8/2015       Past Surgical History:   Procedure Laterality Date    ANGIOGRAPHY N/A 6/28/2019    Procedure: ANGIOGRAM-PV STENT;  Surgeon: Ewa Diagnostic Provider;  Location: [facility] OR;  Service: Radiology;  Laterality: N/A;    ANGIOPLASTY      total x5 stents    COLONOSCOPY N/A 10/6/2015    Procedure: COLONOSCOPY;  Surgeon: Shekhar Richards MD;  Location: [facility] ENDO (2ND FLR);  Service: Endoscopy;  Laterality: N/A;  BMI over 55/2nd floor case    5 day hold Plavix, Dr Kwadwo Arroyo    CORONARY ANGIOGRAPHY Right 3/20/2019    Procedure: ANGIOGRAM, CORONARY ARTERY;  Surgeon: Bob Duque MD;  Location: [facility] CATH LAB;  Service: Cardiology;  Laterality: Right;    CORONARY ARTERY BYPASS GRAFT  2017    x3    CORONARY BYPASS GRAFT ANGIOGRAPHY  3/20/2019    Procedure: Bypass graft study;  Surgeon: Bob Duque MD;  Location: [facility] CATH LAB;  Service: Cardiology;;    CYST REMOVAL      ESOPHAGOGASTRODUODENOSCOPY N/A 7/8/2019    Procedure: ESOPHAGOGASTRODUODENOSCOPY (EGD);  Surgeon: Huan Brumfield MD;  Location: [facility] ENDO;  Service: Endoscopy;  Laterality: N/A;    GASTRECTOMY      INSERTION OF DIALYSIS CATHETER N/A 5/17/2019    Procedure: pleurx;  Surgeon: Ewa Diagnostic Provider;  Location: [facility] OR 2ND FLR;  Service: General;  Laterality: N/A;  [facility]    KNEE ARTHROSCOPY      perianal surgery      perianal cyst removed       Review of patient's allergies indicates:  No Known Allergies  Family History     Problem Relation (Age of Onset)    Cancer Mother, Father, Paternal Grandfather, Brother    Diabetes Maternal Grandmother    Heart disease Father    No Known Problems Paternal Grandmother    Obesity Sister    Parkinsonism Brother    Stroke Maternal Grandfather        Tobacco Use    Smoking status: Former Smoker      Packs/day: 2.00     Years: 30.00     Pack years: 60.00     Types: Cigarettes     Last attempt to quit: 2/1/2005     Years since quitting: 15.0    Smokeless tobacco: Never Used   Substance and Sexual Activity    Alcohol use: No     Comment: started ~2014, reports 1 shot daily, max 3 shots daily, vague about alcohol consumption. Last drink 9/2018    Drug use: No    Sexual activity: Not on file     Review of Systems   Constitutional: Positive for activity change and fatigue. Negative for chills and fever.   HENT: Negative for facial swelling, mouth sores and trouble swallowing.    Eyes: Negative for pain and redness.   Respiratory: Negative for cough and shortness of breath.    Cardiovascular: Positive for chest pain. Negative for leg swelling.   Gastrointestinal: Positive for abdominal pain and vomiting. Negative for diarrhea.   Genitourinary: Negative for dysuria and hematuria.   Musculoskeletal: Negative for gait problem and neck stiffness.   Skin: Negative for rash and wound.   Neurological: Negative for seizures and headaches.   Psychiatric/Behavioral: Negative for confusion and hallucinations.     Objective:     Vital Signs (Most Recent):  Temp: 98.1 °F (36.7 °C) (02/24/20 1228)  Pulse: 83 (02/24/20 1228)  Resp: 17 (02/24/20 1228)  BP: (!) 119/59 (02/24/20 1228)  SpO2: 97 % (02/24/20 1228) Vital Signs (24h Range):  Temp:  [96.4 °F (35.8 °C)-99.3 °F (37.4 °C)] 98.1 °F (36.7 °C)  Pulse:  [77-95] 83  Resp:  [17-19] 17  SpO2:  [95 %-99 %] 97 %  BP: (107-141)/(57-67) 119/59     Weight: 108.9 kg (240 lb) (02/23/20 1212)  Body mass index is 38.74 kg/m².    No intake or output data in the 24 hours ending 02/24/20 1535    Lines/Drains/Airways     Peripheral Intravenous Line                 Peripheral IV - Single Lumen 08/23/19 1319 20 G Left Hand 185 days         Peripheral IV - Single Lumen 02/23/20 1327 20 G Right Antecubital 1 day                Physical Exam   Constitutional: He is oriented to person, place, and  time. He appears well-developed and well-nourished. No distress.   HENT:   Head: Normocephalic and atraumatic.   Eyes: Conjunctivae are normal. No scleral icterus.   Neck: Neck supple. No thyromegaly present.   Cardiovascular: Normal rate, regular rhythm and intact distal pulses.   Pulmonary/Chest: Effort normal and breath sounds normal. No respiratory distress.   Abdominal:   Mild subcostal RUQ pain ; non rigid ; no guarding   Musculoskeletal: Normal range of motion. He exhibits no tenderness.   Neurological: He is alert and oriented to person, place, and time.   Skin: Skin is warm and dry. No rash noted.   Psychiatric: He has a normal mood and affect. His behavior is normal.   Vitals reviewed.      Significant Labs:  Amylase: No results for input(s): AMYLASE in the last 48 hours.  Blood Culture: No results for input(s): LABBLOO in the last 48 hours.  CBC:   Recent Labs   Lab 02/23/20  1238 02/24/20  0542   WBC 16.63* 18.81*   HGB 13.7* 13.8*   HCT 41.2 41.6    181     CMP:   Recent Labs   Lab 02/24/20  0542   *   CALCIUM 8.6*   ALBUMIN 2.9*   PROT 6.3      K 3.9   CO2 27      BUN 21   CREATININE 1.4   ALKPHOS 292*   ALT 70*   AST 38   BILITOT 0.8     Coagulation:   Recent Labs   Lab 02/23/20  1238   INR 1.0     CRP: No results for input(s): CRP in the last 48 hours.  ESR: No results for input(s): SEDRATE in the last 48 hours.  H.Pylori Ab IgG: No results for input(s): HPYLORIIGG in the last 48 hours.  Lipase: No results for input(s): LIPASE in the last 48 hours.    Significant Imaging:  U/S: I have reviewed all results within the past 24 hours and my personal findings are:  stable to increased biliary dilatation

## 2024-05-15 NOTE — PROGRESS NOTES
Patient admitted to room 01, vitals are stable. Patient offered rights and responsibilities.     __m__ Safety:       (Environmental)  [place] to environment  Ensure ID band is correct and in place/ allergy band as needed  Assess for fall risk  Initiate fall precautions as applicable (fall band, side rails, etc.)  Call light within reach  Bed in low position/ wheels locked    __m__ Pain:       Assess pain level and characteristics  Administer analgesics as ordered  Assess effectiveness of pain management and report to MD as needed    __m__ Knowledge Deficit:  Assess baseline knowledge  Provide teaching at level of understanding  Provide teaching via preferred learning method  Evaluate teaching effectiveness    _m___ Hemodynamic/Respiratory Status:       (Pre and Post Procedure Monitoring)  Assess/Monitor vital signs and LOC  Assess Baseline SpO2 prior to any sedation  Obtain weight/height  Assess vital signs/ LOC until patient meets discharge criteria  Monitor procedure site and notify MD of any issues

## 2024-05-16 NOTE — PROGRESS NOTES
5/16/24 1032 Follow up call completed. Spoke with patient directly. Patient denies post procedure pain. Patient denies bleeding, bruising, or edema at puncture site. Patient denies having post procedure questions. Patient thankful for the care she received.

## 2024-05-17 ENCOUNTER — PATIENT MESSAGE (OUTPATIENT)
Dept: PULMONOLOGY | Age: 69
End: 2024-05-17

## 2024-05-17 NOTE — TELEPHONE ENCOUNTER
Left a voicemail for patient to call the office so that we can schedule a follow up for her Biopsy results

## 2024-05-22 ENCOUNTER — APPOINTMENT (OUTPATIENT)
Dept: PHARMACY | Age: 69
End: 2024-05-22
Payer: MEDICARE

## 2024-05-22 ENCOUNTER — OFFICE VISIT (OUTPATIENT)
Dept: PULMONOLOGY | Age: 69
End: 2024-05-22
Payer: MEDICARE

## 2024-05-22 VITALS
HEIGHT: 61 IN | SYSTOLIC BLOOD PRESSURE: 114 MMHG | TEMPERATURE: 98.3 F | BODY MASS INDEX: 26.58 KG/M2 | OXYGEN SATURATION: 96 % | DIASTOLIC BLOOD PRESSURE: 68 MMHG | WEIGHT: 140.8 LBS | HEART RATE: 77 BPM

## 2024-05-22 DIAGNOSIS — R91.1 LUNG NODULE SEEN ON IMAGING STUDY: Primary | ICD-10-CM

## 2024-05-22 DIAGNOSIS — J44.9 CHRONIC OBSTRUCTIVE PULMONARY DISEASE, UNSPECIFIED COPD TYPE (HCC): ICD-10-CM

## 2024-05-22 DIAGNOSIS — R59.0 LAD (LYMPHADENOPATHY), MEDIASTINAL: ICD-10-CM

## 2024-05-22 PROCEDURE — 3078F DIAST BP <80 MM HG: CPT | Performed by: INTERNAL MEDICINE

## 2024-05-22 PROCEDURE — G8417 CALC BMI ABV UP PARAM F/U: HCPCS | Performed by: INTERNAL MEDICINE

## 2024-05-22 PROCEDURE — 4004F PT TOBACCO SCREEN RCVD TLK: CPT | Performed by: INTERNAL MEDICINE

## 2024-05-22 PROCEDURE — 99214 OFFICE O/P EST MOD 30 MIN: CPT | Performed by: INTERNAL MEDICINE

## 2024-05-22 PROCEDURE — 3017F COLORECTAL CA SCREEN DOC REV: CPT | Performed by: INTERNAL MEDICINE

## 2024-05-22 PROCEDURE — G8399 PT W/DXA RESULTS DOCUMENT: HCPCS | Performed by: INTERNAL MEDICINE

## 2024-05-22 PROCEDURE — 3023F SPIROM DOC REV: CPT | Performed by: INTERNAL MEDICINE

## 2024-05-22 PROCEDURE — 1090F PRES/ABSN URINE INCON ASSESS: CPT | Performed by: INTERNAL MEDICINE

## 2024-05-22 PROCEDURE — G8427 DOCREV CUR MEDS BY ELIG CLIN: HCPCS | Performed by: INTERNAL MEDICINE

## 2024-05-22 PROCEDURE — 1123F ACP DISCUSS/DSCN MKR DOCD: CPT | Performed by: INTERNAL MEDICINE

## 2024-05-22 PROCEDURE — 3074F SYST BP LT 130 MM HG: CPT | Performed by: INTERNAL MEDICINE

## 2024-05-22 RX ORDER — BUDESONIDE AND FORMOTEROL FUMARATE DIHYDRATE 160; 4.5 UG/1; UG/1
2 AEROSOL RESPIRATORY (INHALATION) 2 TIMES DAILY
Qty: 30.6 G | Refills: 1 | Status: SHIPPED | OUTPATIENT
Start: 2024-05-22

## 2024-05-22 ASSESSMENT — ENCOUNTER SYMPTOMS
VOICE CHANGE: 0
APNEA: 0
CHOKING: 0
CHEST TIGHTNESS: 0
COUGH: 1
WHEEZING: 1
STRIDOR: 0
ABDOMINAL DISTENTION: 0
SHORTNESS OF BREATH: 0
TROUBLE SWALLOWING: 0

## 2024-05-22 NOTE — PROGRESS NOTES
Medication Management   [facility]  Smoking Cessation Clinic  [phone] (phone)  [phone] (fax)  Grazyna Dey          1955  Nilda Sherman MD Kristie R Wilson is a 68 y.o. female was called today for visit 2/12.    Previous Goal/Quit Date: N/A                       New Goal/Quit Date: within next 3 months    Scaling:  Importance level:  10 (previous level was 10 )  Confidence level: 6 (previous level was 6 )    Has the patient smoked ANY cigarettes (even 1 puff) in the last 7 days?: Yes  If yes, how much? 0.75PPD What were you doing/do you know what the trigger was? Boredom and habit and driving      Baseline PPD: 0.75  Current PPD: 0.75  Smoking Reduction Percent: 0%    Pharmacological Agent used: NRT + PRN- pt will be using patch and gum-insurance does not cover the NRT.  Pt is picking up free patches from our OP pharmacy today and she knows she will have to pay for gum out of pocket or can call the 1-800-QUIT-NOW line and do their phone counseling to get free gum.    Concerns: None      PLAN    Discussed the following:    Went over pages 2-5 in smoking cessation booklet  Benefits to quitting:  health, time, financial  Nicotine replacement and pharmacological options  Cigarette pack wraps to identify triggers explained to pt  Tobacco cessation quit tips    Notes/comments: pt very motivated and really wants to quit smoking.     Pts goal for the next week are to fill out pack wrap paper, get patches from our OP pharmacy and try to start tapering daily cigarette usage.     Next appointment:  5/29/24 at 0830 by phone.    Grazyna Dey verbalized understanding of the above information and denied further questions or concerns.    The total time spent with the pt was 28 minutes.    Charges dropped No. If no, due to visit done over the phone.

## 2024-05-22 NOTE — PROGRESS NOTES
Neck Circumference -   14 inches   Mallampati - 1    Lung Nodule Screening     [x] Qualifies    [] Does not qualify   [] Declined    [] Completed  
NODULES:  A 2 mm pulmonary nodule is in the left upper lobe on image #76.     A 13 mm pulmonary nodules in the peripheral aspect of the left lower lobes abutting the fissure.     LYMPHADENOPATHY:  There are no pathologically enlarged lymph nodes in the mediastinum, hilar or axillary regions.     OTHER (LUNGS/MEDIASTINUM/MUSCULOSKELETAL/ABDOMEN):  The heart is normal in size. There are coronary artery calcifications. There is no pericardial or pleural effusion. There is no pneumothorax. Some atherosclerosis of the thoracic aorta. Some degenerative changes are in the spine. The liver has a somewhat   hypoattenuated appearance which could be related to fatty infiltration.     IMPRESSION:  1. There is a 13 mm pulmonary nodule in the left lower lobe abutting the fissure. PET/CT is recommended for further characterization.  2. There is a 2 mm pulmonary nodule in the left upper lobe.  3. There are no pathologically enlarged lymph nodes.  4. LUNGRADS ASSESSMENT VALUE: 4A    PFTs:          Assessment / Plan:       Diagnosis Orders   1. Lung nodule seen on imaging study  HECTOR Screen With Reflex    Histoplasma Antibodies    Rheumatoid Factor    Cyclic Citrul Peptide Antibody, IgG    CT CHEST WO CONTRAST      2. LAD (lymphadenopathy), mediastinal  HECTOR Screen With Reflex    Histoplasma Antibodies    Rheumatoid Factor    Cyclic Citrul Peptide Antibody, IgG    CT CHEST WO CONTRAST      3. Chronic obstructive pulmonary disease, unspecified COPD type (HCC)          13 mm left lower lobe lung nodule in a high risk patient with 27% malignant potential using [facility] this lung nodule calculator.    Core needle biopsy of this lesion reveals benign lymphohistiocytic inflammation suggesting the periphery of a granuloma I tend to believe this is related to histoplasmosis, FDG avid left hilar lymph node which revealed benign lymphocytic elements during EBUS.  These findings are reassuring however I cannot be 100% confident that the

## 2024-05-28 ENCOUNTER — NURSE ONLY (OUTPATIENT)
Dept: LAB | Age: 69
End: 2024-05-28

## 2024-05-28 DIAGNOSIS — R59.0 LAD (LYMPHADENOPATHY), MEDIASTINAL: ICD-10-CM

## 2024-05-28 DIAGNOSIS — R91.1 LUNG NODULE SEEN ON IMAGING STUDY: ICD-10-CM

## 2024-05-28 LAB — RHEUMATOID FACT SERPL-ACNC: 10 IU/ML (ref 0–13)

## 2024-05-29 ENCOUNTER — HOSPITAL ENCOUNTER (OUTPATIENT)
Dept: PHARMACY | Age: 69
Setting detail: THERAPIES SERIES
Discharge: HOME | End: 2024-05-29
Payer: MEDICARE

## 2024-05-29 NOTE — PROGRESS NOTES
Medication Management   [facility]  Smoking Cessation Clinic  [phone] (phone)  [phone] (fax)  Grazyna Dey          1955  Nilda Sherman MD Kristfrantz HENSON Tiburcio is a 68 y.o. female who presents today for visit 3/12.    Scaling:  Importance level:  8 (previous level was 10)  Confidence level: 6 (previous level was 6)      Baseline PPD: 0.75 PPD  Current PPD: 0.75 PPD  Smoking Reduction Percent: 100%    Has patches and gum, has not started yet.     Had several cookouts and gatherings this past weekend, she felt like it derailed her a bit.  Discussed refocusing and getting back on track and being away of this trigger for future reference.       PLAN  Discussed the following:   Session #3  Went over pages 6-7 in smoking cessation booklet  Tobacco cessation quit tips  Cigarette pack wraps to identify triggers  Setting a quit date and making a quit plan  Preparing for quit day- clean car, house, etc.  Get emergency quit pack together.  Discuss 4-D's to help with cravings  Trigger avoidance and coping with the urge to quit      Pts goal for the next week: select a quit date, actively work on quit plan for triggers, rework routines and habits, keep cigarettes in an inconvenient location, get emergency quit pack together.     Next appointment:   at 0830 via phone    Grazyna Dey verbalized understanding of the above information and denied further questions or concerns.    The total time spent with the pt was 30 minutes.    Charges dropped No. If no, phone appt.     For Pharmacy Admin Tracking Only    Intervention Detail: Adherence Monitorin  Total # of Interventions Recommended: 1  Total # of Interventions Accepted: 1  Time Spent (min): 30

## 2024-05-30 LAB
CYCLIC CITRULLINATED PEPTIDE ANTIBODY IGG: 1.1 U/ML (ref 0–7)
HISTOPLASMA AB, ID: NORMAL
NUCLEAR IGG SER QL IA: NORMAL

## 2024-06-05 ENCOUNTER — HOSPITAL ENCOUNTER (OUTPATIENT)
Dept: PHARMACY | Age: 69
Setting detail: THERAPIES SERIES
Discharge: HOME | End: 2024-06-05

## 2024-06-05 NOTE — PROGRESS NOTES
Medication Management   [facility]  Smoking Cessation Clinic  [phone] (phone)  [phone] (fax)  Grazyna Dey          1955  Nilda Sherman MD Kristfrantz HENSON Tiburcio is a 68 y.o. female who presents today for visit .    Scaling:  Importance level:  10 (previous level was 8 )  Confidence level: 7 (previous level was 8 )    Has the patient smoked ANY cigarettes (even 1 puff) in the last 7 days?: Yes    Baseline PPD: 0.75 PPD  Current PPD: 0.5 PPD  Smoking Reduction Percent:  33%    Pharmacological Agent used: NRT + PRN, plans to start on quit date: 24      Has noticed that cigarettes are starting to taste bad.  Has been keeping busy, this has helped her wean her use.  Not taking cigarettes in the car with her, this has helped  Also cleaning out closets to keep busy.    PLAN  Discussed the following:   Session #4  Went over pages 8-15 in smoking cessation booklet  Physical and psychological dependence associated with smoking and potential withdrawal symptoms  Discuss how and when body changes/heals after quitting smoking  Difference between lapse and relapse  Provide list of support groups/websites  Explained that next visit will be 1-on-1 and then followed by 6 weeks of follow up phone calls    Quit Date: 24    Standard written patient education provided to pt:  Cigarette pack wraps   Quit Plan      Pts goal for the next week: continue to work on quit plan for triggers. When reaching for a cigarettes, be aware of why and actively plan for how you will manage this situation after your quit date.    Next appointment:   at 830 via phone    Grazyna eDy verbalized understanding of the above information and denied further questions or concerns.    The total time spent with the pt was 30 minutes.    Anna Frazier, PharmD, BCPS, CACP, CTTS     For Pharmacy Admin Tracking Only    Intervention Detail: Adherence Monitorin  Total # of Interventions Recommended: 1  Total #

## 2024-06-19 ENCOUNTER — HOSPITAL ENCOUNTER (OUTPATIENT)
Dept: PHARMACY | Age: 69
Setting detail: THERAPIES SERIES
Discharge: HOME | End: 2024-06-19

## 2024-06-19 ENCOUNTER — TELEPHONE (OUTPATIENT)
Dept: PHARMACY | Age: 69
End: 2024-06-19

## 2024-06-19 NOTE — TELEPHONE ENCOUNTER
Called pt at this time for our scheduled phone appt for smoking cessation.  There was no answer so I left a message asking the pt to call us back at [phone], option 2.

## 2024-06-19 NOTE — PROGRESS NOTES
Medication Management   [facility]  Smoking Cessation Clinic  [phone] (phone)  [phone] (fax)      Patient called me back at this time to follow up on smoking cessation.      Encounter completed via telephone.     Have you smoked any, even 1 puff, in the last 7 days? Yes  If yes, how much? But none since starting the patch on 6/17/24.    Baseline PPD: 0.75  Current PPD: 0  Smoking Reduction Percent from initial baseline: 100%, quit on 6/17    Pharmacological Agent used: NRT + PRN and the med/NRT was started on 6/17.  Pt using patch and gum and using approx 2 pieces of gum a day.    Compliant with NRT and/or med regiment?  Yes  Any side effects or problems? no      Plan:  Pt started the patch on 6/17 and has been smoke free since then.  I congratulated pt on the great work. Pt states it has not been easy but she is doing it.  Encouraged pt to keep up the great work.      Pt had no questions at this time.   Total time spent on phone with pt:  6 minutes    No charges dropped due to appt done over the phone.     Next appointment 7/17 at 0830 via phone.

## 2024-07-17 ENCOUNTER — HOSPITAL ENCOUNTER (OUTPATIENT)
Dept: PHARMACY | Age: 69
Setting detail: THERAPIES SERIES
Discharge: HOME | End: 2024-07-17

## 2024-07-17 ENCOUNTER — TELEPHONE (OUTPATIENT)
Dept: PHARMACY | Age: 69
End: 2024-07-17

## 2024-07-17 NOTE — PROGRESS NOTES
Medication Management   [facility]  Smoking Cessation Clinic  [phone] (phone)  [phone] (fax)      Patient called at this time to follow up on smoking cessation.    Encounter completed via telephone.     Have you smoked any, even 1 puff, in the last 7 days? No  If no, pt encouraged to keep up the good work and to use their coping mechanisms to help with the cravings.     Baseline PPD: 0.75  Current PPD: 0  Smoking Reduction Percent from initial baseline: 7/10/24    Pharmacological Agent used: NRT + PRN- pt has patches and gum- pt states not using the gum.  Pt using patches, they were started on 6/17- see plan below    Compliant with NRT and/or med regiment?  Yes  Any side effects or problems? no      Plan:  Pt had stopped smoking on 6/17 and then had a lapse for approx 1 week and then stopped smoking again on 7/10 and remains smoke free at this time.  Pt did stop wearing patches when had the lapse.  Pt has approx 3 weeks of 21mg patches left.  I explained to pt how she will finish the 21mg patches and then go to 14mg patch for 2 weeks then 7 mg patch for 2 weeks.  Pts insurance does not cover the NRT and we have already given the pt 6 weeks of free patches.  I asked pt and made sure she could get/afford rest of patches and pt states she can get the rest of the patches on her own.  Since insurance does not cover them I told pt she can just get the 14mg and 7mg patches OTC.  I did explain to pt that when she runs out of the 21mg patch if she feels like she is still really struggling with cravings that she could buy 1 more box of 21mg(2 weeks) patches and do those before going down to the 14mg.     Pt had no questions at this time.   Total time spent on phone with pt:  10 minutes    No charges dropped due to appt done over the phone.    Next appointment 8/14 at 0830 via phone.

## 2024-07-17 NOTE — TELEPHONE ENCOUNTER
Called pt at this time for our scheduled phone appt for smoking cessation. There was no answer so I left a message asking the pt to call us back at [phone], option 2.

## 2024-08-14 ENCOUNTER — HOSPITAL ENCOUNTER (OUTPATIENT)
Dept: CT IMAGING | Age: 69
Discharge: HOME | End: 2024-08-14
Attending: INTERNAL MEDICINE
Payer: MEDICARE

## 2024-08-14 DIAGNOSIS — R59.0 LAD (LYMPHADENOPATHY), MEDIASTINAL: ICD-10-CM

## 2024-08-14 DIAGNOSIS — R91.1 LUNG NODULE SEEN ON IMAGING STUDY: ICD-10-CM

## 2024-08-14 PROCEDURE — 71250 CT THORAX DX C-: CPT

## 2024-08-15 ENCOUNTER — OFFICE VISIT (OUTPATIENT)
Dept: FAMILY MEDICINE CLINIC | Age: 69
End: 2024-08-15
Payer: MEDICARE

## 2024-08-15 VITALS
RESPIRATION RATE: 12 BRPM | WEIGHT: 140.8 LBS | TEMPERATURE: 98.1 F | DIASTOLIC BLOOD PRESSURE: 84 MMHG | BODY MASS INDEX: 26.58 KG/M2 | HEART RATE: 88 BPM | SYSTOLIC BLOOD PRESSURE: 132 MMHG | HEIGHT: 61 IN

## 2024-08-15 DIAGNOSIS — E78.2 MIXED HYPERLIPIDEMIA: ICD-10-CM

## 2024-08-15 DIAGNOSIS — Z00.00 MEDICARE ANNUAL WELLNESS VISIT, SUBSEQUENT: Primary | ICD-10-CM

## 2024-08-15 DIAGNOSIS — R73.01 IFG (IMPAIRED FASTING GLUCOSE): ICD-10-CM

## 2024-08-15 DIAGNOSIS — R20.0 RIGHT SIDED NUMBNESS: ICD-10-CM

## 2024-08-15 DIAGNOSIS — I10 ESSENTIAL HYPERTENSION: ICD-10-CM

## 2024-08-15 DIAGNOSIS — R53.1 RIGHT SIDED WEAKNESS: ICD-10-CM

## 2024-08-15 PROCEDURE — G8427 DOCREV CUR MEDS BY ELIG CLIN: HCPCS | Performed by: FAMILY MEDICINE

## 2024-08-15 PROCEDURE — G8417 CALC BMI ABV UP PARAM F/U: HCPCS | Performed by: FAMILY MEDICINE

## 2024-08-15 PROCEDURE — 99213 OFFICE O/P EST LOW 20 MIN: CPT | Performed by: FAMILY MEDICINE

## 2024-08-15 PROCEDURE — 3017F COLORECTAL CA SCREEN DOC REV: CPT | Performed by: FAMILY MEDICINE

## 2024-08-15 PROCEDURE — 4004F PT TOBACCO SCREEN RCVD TLK: CPT | Performed by: FAMILY MEDICINE

## 2024-08-15 PROCEDURE — 1123F ACP DISCUSS/DSCN MKR DOCD: CPT | Performed by: FAMILY MEDICINE

## 2024-08-15 PROCEDURE — 3079F DIAST BP 80-89 MM HG: CPT | Performed by: FAMILY MEDICINE

## 2024-08-15 PROCEDURE — 3075F SYST BP GE 130 - 139MM HG: CPT | Performed by: FAMILY MEDICINE

## 2024-08-15 PROCEDURE — G0439 PPPS, SUBSEQ VISIT: HCPCS | Performed by: FAMILY MEDICINE

## 2024-08-15 PROCEDURE — 1090F PRES/ABSN URINE INCON ASSESS: CPT | Performed by: FAMILY MEDICINE

## 2024-08-15 PROCEDURE — G8399 PT W/DXA RESULTS DOCUMENT: HCPCS | Performed by: FAMILY MEDICINE

## 2024-08-15 RX ORDER — LOSARTAN POTASSIUM 100 MG/1
50 TABLET ORAL DAILY
COMMUNITY
Start: 2024-08-15

## 2024-08-15 ASSESSMENT — PATIENT HEALTH QUESTIONNAIRE - PHQ9
SUM OF ALL RESPONSES TO PHQ9 QUESTIONS 1 & 2: 0
2. FEELING DOWN, DEPRESSED OR HOPELESS: NOT AT ALL
1. LITTLE INTEREST OR PLEASURE IN DOING THINGS: NOT AT ALL
SUM OF ALL RESPONSES TO PHQ QUESTIONS 1-9: 0

## 2024-08-15 ASSESSMENT — LIFESTYLE VARIABLES
HOW OFTEN DO YOU HAVE A DRINK CONTAINING ALCOHOL: 2-3 TIMES A WEEK
HOW MANY STANDARD DRINKS CONTAINING ALCOHOL DO YOU HAVE ON A TYPICAL DAY: 1 OR 2

## 2024-08-15 NOTE — PROGRESS NOTES
[facility]  [facility]  [address]  [facility] [address]  Dept: [phone]  Loc: [phone]    8/15/2024    Chief Complaint   Patient presents with    Medicare AWV     Here for AWV and check up for chronic conditions. No labs done for today's visit.   Following with Dr. Maldonado for lung nodule. CT done yesterday.     Numbness     Episodic numbness in right hand and right knee. Twitching, spasms, and numbness. Happening more frequently.          Medicare Annual Wellness Visit    Grazyna Dey is here for Medicare AWV (Here for AWV and check up for chronic conditions. No labs done for today's visit. /Following with Dr. Maldonado for lung nodule. CT done yesterday. ) and Numbness (Episodic numbness in right hand and right knee. Twitching, spasms, and numbness. Happening more frequently. )    Assessment & Plan     1. Medicare annual wellness visit, subsequent  2. Essential hypertension  -     Lipid Panel; Future  -     Comprehensive Metabolic Panel; Future  -     CBC with Auto Differential; Future  3. IFG (impaired fasting glucose)  -     Comprehensive Metabolic Panel; Future  -     Hemoglobin A1C; Future  4. Mixed hyperlipidemia  -     Lipid Panel; Future  -     Comprehensive Metabolic Panel; Future  5. Right sided numbness  -     External Referral To Neurology  6. Right sided weakness  -     External Referral To Neurology    Continue losartan 100 mg 1/2 tablet daily for hypertension.  This condition is stable and well-controlled.    Diabetic diet and regular physical activity encouraged in order to reduce mildly elevated blood sugars    Labs as above before next visit    Prevnar 20 recommended    Smoking cessation again encouraged.  She has both patches and nicotine gum at home but has not started using them yet.    Patient will follow-up with her specialists as planned    Her intermittent symptoms of right sided numbness and weakness in her right hand and right knee

## 2024-08-15 NOTE — PATIENT INSTRUCTIONS
device in the bathroom with you.   Where can you learn more?  Go to https://www.[employer].net/patientEd and enter G117 to learn more about \"Preventing Falls: Care Instructions.\"  Current as of: July 17, 2023  Content Version: 14.1  © 8483-6733 [employer].   Care instructions adapted under license by [employer]. If you have questions about a medical condition or this instruction, always ask your healthcare professional. [employer] disclaims any warranty or liability for your use of this information.           A Healthy Heart: Care Instructions  Overview     Coronary artery disease, also called heart disease, occurs when a substance called plaque builds up in the vessels that supply oxygen-rich blood to your heart muscle. This can narrow the blood vessels and reduce blood flow. A heart attack happens when blood flow is completely blocked. A high-fat diet, smoking, and other factors increase the risk of heart disease.  Your doctor has found that you have a chance of having heart disease. A heart-healthy lifestyle can help keep your heart healthy and prevent heart disease. This lifestyle includes eating healthy, being active, staying at a weight that's healthy for you, and not smoking or using tobacco. It also includes taking medicines as directed, managing other health conditions, and trying to get a healthy amount of sleep.  Follow-up care is a key part of your treatment and safety. Be sure to make and go to all appointments, and call your doctor if you are having problems. It's also a good idea to know your test results and keep a list of the medicines you take.  How can you care for yourself at home?  Diet    Use less salt when you cook and eat. This helps lower your blood pressure. Taste food before salting. Add only a little salt when you think you need it. With time, your taste buds will adjust to less salt.     Eat fewer snack items, fast foods, canned soups, and other high-salt,

## 2024-08-16 ENCOUNTER — TELEPHONE (OUTPATIENT)
Dept: FAMILY MEDICINE CLINIC | Age: 69
End: 2024-08-16

## 2024-08-16 NOTE — TELEPHONE ENCOUNTER
Patient seen in office yesterday and referral made to neurology at [facility].  Urgent referral faxed and follow up call made. Their office provided a phone number of [phone] for pt to call and schedule.   Patient notified and will call. If her appt is too far out, she will contact our office to get orders for MRI to complete now.

## 2024-08-21 ENCOUNTER — TELEPHONE (OUTPATIENT)
Age: 69
End: 2024-08-21

## 2024-08-21 ENCOUNTER — OFFICE VISIT (OUTPATIENT)
Dept: PULMONOLOGY | Age: 69
End: 2024-08-21

## 2024-08-21 VITALS
SYSTOLIC BLOOD PRESSURE: 148 MMHG | TEMPERATURE: 97.2 F | DIASTOLIC BLOOD PRESSURE: 76 MMHG | BODY MASS INDEX: 27.56 KG/M2 | WEIGHT: 146 LBS | HEART RATE: 87 BPM | OXYGEN SATURATION: 99 % | HEIGHT: 61 IN

## 2024-08-21 DIAGNOSIS — F17.200 SMOKER: ICD-10-CM

## 2024-08-21 DIAGNOSIS — R91.1 LUNG NODULE SEEN ON IMAGING STUDY: Primary | ICD-10-CM

## 2024-08-21 DIAGNOSIS — J44.9 CHRONIC OBSTRUCTIVE PULMONARY DISEASE, UNSPECIFIED COPD TYPE (HCC): ICD-10-CM

## 2024-08-21 DIAGNOSIS — J41.1 MUCOPURULENT CHRONIC BRONCHITIS (HCC): ICD-10-CM

## 2024-08-21 RX ORDER — ALBUTEROL SULFATE 90 UG/1
2 AEROSOL, METERED RESPIRATORY (INHALATION) EVERY 6 HOURS PRN
Qty: 18 G | Refills: 3 | Status: SHIPPED | OUTPATIENT
Start: 2024-08-21

## 2024-08-21 ASSESSMENT — ENCOUNTER SYMPTOMS
CHEST TIGHTNESS: 0
VOICE CHANGE: 0
COUGH: 1
WHEEZING: 1
STRIDOR: 0
CHOKING: 0
SHORTNESS OF BREATH: 0
APNEA: 0
ABDOMINAL DISTENTION: 0
TROUBLE SWALLOWING: 0

## 2024-08-21 NOTE — PROGRESS NOTES
Neck Circumference -   14  Mallampati - 1    Lung Nodule Screening     [x] Qualifies    [] Does not qualify   [] Declined    [] Completed

## 2024-08-21 NOTE — TELEPHONE ENCOUNTER
Called pt at this time to follow up on smoking cessation.  There was no answer so I left a message asking the pt to call us back at [phone], option 2.

## 2024-08-21 NOTE — PROGRESS NOTES
Subjective:      Patient ID: Grazyna Dey is a 69 y.o. female.      CC: To review CT chest      HPI    Elif comes for follow-up to review CT scan of the chest examination with attention to the left lower lobe lung nodule which was successfully biopsied 3 months ago revealing benign lymphohistiocytic inflammation with no malignancy, Elif is a heavy smoker continues smoking daily and the possibility of sampling error in an actual malignant lesion is not unreasonable reason why we are keeping a close eye on it, this follow-up CT scan is reassuring revealing no evolving changes in the lesion itself and no new suspicious nodules.    Workup for other etiologies is not revealing including histo antibodies, HECTOR screen.    Proteinomic markers  markers for malignancy (Nodify Xl2) suggest a posttest risk of malignancy of 20% which is reduced compared to the pretest probability of 32%, however a malignant process is not completely rule out by this test.    Edvin is having difficulty buying [employer] as the cost is $500 a month, samples were provided today as well as paperwork for financial assistance from [employer] was completed.    It is really important that Elif stop smoking currently smoking between half and 1 pack cigarettes a day she is very symptomatic with chest tightness chronic productive cough as well as dyspnea on exertion      Previous notes  Patient comes with her daughter to review results of CT-guided lung biopsy from the left lung nodule  Results are consistent with benign process with \"lymphohistiocytic inflammation\"      Clinical Information: LUNG NODULE     FINAL DIAGNOSIS:   Lung, LLL, nodule, needle core biopsy:     Lymphohistiocytic inflammation.     See microscopic description.     Specimen:   BIOPSY OF LUNG, LLL NODULE     Intraoperative Consultation:   Touch Prep DX:  1.  Alveolar macrophages, blood, scattered bland   epithelial cells; 2.  Blood; 3.  Increased cellularity consisting of   bland

## 2024-08-28 ENCOUNTER — TELEPHONE (OUTPATIENT)
Age: 69
End: 2024-08-28

## 2024-08-28 NOTE — TELEPHONE ENCOUNTER
Medication Management   [facility]  Smoking Cessation Clinic  [phone] (phone)  [phone] (fax)      Patient called at this time to follow up on smoking cessation.    Encounter completed via telephone.     Have you smoked any, even 1 puff, in the last 7 days? No  If no, pt encouraged to keep up the good work and to use their coping mechanisms to help with the cravings.     Baseline PPD: 0.75  Current PPD: 0  Smoking Reduction Percent from initial baseline: 100%, quit on 7/10/24    Pharmacological Agent used: None.  Pt was using patches but states she stopped them approx 7-10 days ago because she did not like them.       Plan:  Pt remains smoke free and I congratulated her on this.  Pt states she has been busy with other appts.   Encouraged pt to keep up the great work and remain smoke free. I told pt we could call back in mid October to see if pt still smoke free for 3 months.  We set up appt by phone.     Pt had no questions at this time.   Total time spent on phone with pt:  5 minutes    No charges dropped due to appt done over the phone.       Next appointment 10/16 at 0830 via phone.

## 2024-09-17 ENCOUNTER — TELEPHONE (OUTPATIENT)
Dept: FAMILY MEDICINE CLINIC | Age: 69
End: 2024-09-17

## 2024-10-23 ENCOUNTER — TELEPHONE (OUTPATIENT)
Age: 69
End: 2024-10-23

## 2024-10-23 NOTE — TELEPHONE ENCOUNTER
Called pt at this time to follow up on smoking cessation.  There was no answer so I left a message asking the pt to call us back at [phone], option 2.

## 2024-11-01 ENCOUNTER — APPOINTMENT (OUTPATIENT)
Dept: CT IMAGING | Age: 69
End: 2024-11-01
Payer: MEDICARE

## 2024-11-01 ENCOUNTER — APPOINTMENT (OUTPATIENT)
Dept: MRI IMAGING | Age: 69
End: 2024-11-01
Payer: MEDICARE

## 2024-11-01 ENCOUNTER — HOSPITAL ENCOUNTER (INPATIENT)
Age: 69
LOS: 1 days | Discharge: HOME | End: 2024-11-02
Attending: EMERGENCY MEDICINE
Payer: MEDICARE

## 2024-11-01 DIAGNOSIS — H57.02 ANISOCORIA: Primary | ICD-10-CM

## 2024-11-01 DIAGNOSIS — I65.22 CAROTID STENOSIS, LEFT: ICD-10-CM

## 2024-11-01 DIAGNOSIS — I65.22 INTERNAL CAROTID ARTERY OCCLUSION, LEFT: ICD-10-CM

## 2024-11-01 PROBLEM — R29.90 STROKE-LIKE SYMPTOM: Status: ACTIVE | Noted: 2024-11-01

## 2024-11-01 LAB
ANION GAP SERPL CALC-SCNC: 13 MEQ/L (ref 8–16)
BASOPHILS ABSOLUTE: 0 THOU/MM3 (ref 0–0.1)
BASOPHILS NFR BLD AUTO: 0.5 %
BUN SERPL-MCNC: 20 MG/DL (ref 7–22)
CALCIUM SERPL-MCNC: 9.7 MG/DL (ref 8.5–10.5)
CHLORIDE SERPL-SCNC: 97 MEQ/L (ref 98–111)
CO2 SERPL-SCNC: 27 MEQ/L (ref 23–33)
CREAT SERPL-MCNC: 1 MG/DL (ref 0.4–1.2)
DEPRECATED RDW RBC AUTO: 46.8 FL (ref 35–45)
EOSINOPHIL NFR BLD AUTO: 3 %
EOSINOPHILS ABSOLUTE: 0.3 THOU/MM3 (ref 0–0.4)
ERYTHROCYTE [DISTWIDTH] IN BLOOD BY AUTOMATED COUNT: 12.7 % (ref 11.5–14.5)
GFR SERPL CREATININE-BSD FRML MDRD: 62 ML/MIN/1.73M2
GLUCOSE BLD STRIP.AUTO-MCNC: 121 MG/DL (ref 70–108)
GLUCOSE SERPL-MCNC: 120 MG/DL (ref 70–108)
HCT VFR BLD AUTO: 46.6 % (ref 37–47)
HGB BLD-MCNC: 15.7 GM/DL (ref 12–16)
IMM GRANULOCYTES # BLD AUTO: 0.03 THOU/MM3 (ref 0–0.07)
IMM GRANULOCYTES NFR BLD AUTO: 0.4 %
INR PPP: 0.95 (ref 0.85–1.13)
LYMPHOCYTES ABSOLUTE: 3 THOU/MM3 (ref 1–4.8)
LYMPHOCYTES NFR BLD AUTO: 34.8 %
MCH RBC QN AUTO: 33.9 PG (ref 26–33)
MCHC RBC AUTO-ENTMCNC: 33.7 GM/DL (ref 32.2–35.5)
MCV RBC AUTO: 100.6 FL (ref 81–99)
MONOCYTES ABSOLUTE: 0.7 THOU/MM3 (ref 0.4–1.3)
MONOCYTES NFR BLD AUTO: 8.6 %
NEUTROPHILS ABSOLUTE: 4.5 THOU/MM3 (ref 1.8–7.7)
NEUTROPHILS NFR BLD AUTO: 52.7 %
NRBC BLD AUTO-RTO: 0 /100 WBC
OSMOLALITY SERPL CALC.SUM OF ELEC: 277.6 MOSMOL/KG (ref 275–300)
PLATELET # BLD AUTO: 222 THOU/MM3 (ref 130–400)
PMV BLD AUTO: 10.8 FL (ref 9.4–12.4)
POTASSIUM SERPL-SCNC: 4.6 MEQ/L (ref 3.5–5.2)
RBC # BLD AUTO: 4.63 MILL/MM3 (ref 4.2–5.4)
SODIUM SERPL-SCNC: 137 MEQ/L (ref 135–145)
TROPONIN, HIGH SENSITIVITY: 8 NG/L (ref 0–12)
WBC # BLD AUTO: 8.5 THOU/MM3 (ref 4.8–10.8)

## 2024-11-01 PROCEDURE — 1200000003 HC TELEMETRY R&B

## 2024-11-01 PROCEDURE — 82948 REAGENT STRIP/BLOOD GLUCOSE: CPT

## 2024-11-01 PROCEDURE — 70498 CT ANGIOGRAPHY NECK: CPT

## 2024-11-01 PROCEDURE — 99223 1ST HOSP IP/OBS HIGH 75: CPT

## 2024-11-01 PROCEDURE — 2580000003 HC RX 258: Performed by: STUDENT IN AN ORGANIZED HEALTH CARE EDUCATION/TRAINING PROGRAM

## 2024-11-01 PROCEDURE — 99285 EMERGENCY DEPT VISIT HI MDM: CPT

## 2024-11-01 PROCEDURE — 70496 CT ANGIOGRAPHY HEAD: CPT

## 2024-11-01 PROCEDURE — 72141 MRI NECK SPINE W/O DYE: CPT

## 2024-11-01 PROCEDURE — 6360000004 HC RX CONTRAST MEDICATION: Performed by: EMERGENCY MEDICINE

## 2024-11-01 PROCEDURE — 70551 MRI BRAIN STEM W/O DYE: CPT

## 2024-11-01 PROCEDURE — 70450 CT HEAD/BRAIN W/O DYE: CPT

## 2024-11-01 PROCEDURE — 36415 COLL VENOUS BLD VENIPUNCTURE: CPT

## 2024-11-01 PROCEDURE — 84484 ASSAY OF TROPONIN QUANT: CPT

## 2024-11-01 PROCEDURE — 96374 THER/PROPH/DIAG INJ IV PUSH: CPT

## 2024-11-01 PROCEDURE — 85610 PROTHROMBIN TIME: CPT

## 2024-11-01 PROCEDURE — 85025 COMPLETE CBC W/AUTO DIFF WBC: CPT

## 2024-11-01 PROCEDURE — 80048 BASIC METABOLIC PNL TOTAL CA: CPT

## 2024-11-01 PROCEDURE — 6360000002 HC RX W HCPCS: Performed by: EMERGENCY MEDICINE

## 2024-11-01 RX ORDER — ACETAMINOPHEN 650 MG/1
650 SUPPOSITORY RECTAL EVERY 6 HOURS PRN
Status: DISCONTINUED | OUTPATIENT
Start: 2024-11-01 | End: 2024-11-02 | Stop reason: HOSPADM

## 2024-11-01 RX ORDER — ENOXAPARIN SODIUM 100 MG/ML
40 INJECTION SUBCUTANEOUS DAILY
Status: CANCELLED | OUTPATIENT
Start: 2024-11-01

## 2024-11-01 RX ORDER — ACETAMINOPHEN 325 MG/1
650 TABLET ORAL EVERY 6 HOURS PRN
Status: DISCONTINUED | OUTPATIENT
Start: 2024-11-01 | End: 2024-11-02 | Stop reason: HOSPADM

## 2024-11-01 RX ORDER — SODIUM CHLORIDE 0.9 % (FLUSH) 0.9 %
5-40 SYRINGE (ML) INJECTION EVERY 12 HOURS SCHEDULED
Status: DISCONTINUED | OUTPATIENT
Start: 2024-11-01 | End: 2024-11-02 | Stop reason: HOSPADM

## 2024-11-01 RX ORDER — SODIUM CHLORIDE 9 MG/ML
INJECTION, SOLUTION INTRAVENOUS PRN
Status: DISCONTINUED | OUTPATIENT
Start: 2024-11-01 | End: 2024-11-02 | Stop reason: HOSPADM

## 2024-11-01 RX ORDER — SODIUM CHLORIDE 9 MG/ML
INJECTION, SOLUTION INTRAVENOUS CONTINUOUS
Status: DISCONTINUED | OUTPATIENT
Start: 2024-11-01 | End: 2024-11-02 | Stop reason: HOSPADM

## 2024-11-01 RX ORDER — SODIUM CHLORIDE 0.9 % (FLUSH) 0.9 %
5-40 SYRINGE (ML) INJECTION PRN
Status: DISCONTINUED | OUTPATIENT
Start: 2024-11-01 | End: 2024-11-02 | Stop reason: HOSPADM

## 2024-11-01 RX ORDER — ONDANSETRON 2 MG/ML
4 INJECTION INTRAMUSCULAR; INTRAVENOUS EVERY 6 HOURS PRN
Status: DISCONTINUED | OUTPATIENT
Start: 2024-11-01 | End: 2024-11-02 | Stop reason: HOSPADM

## 2024-11-01 RX ORDER — NICOTINE 21 MG/24HR
1 PATCH, TRANSDERMAL 24 HOURS TRANSDERMAL DAILY
Status: DISCONTINUED | OUTPATIENT
Start: 2024-11-01 | End: 2024-11-02 | Stop reason: HOSPADM

## 2024-11-01 RX ORDER — IOPAMIDOL 755 MG/ML
80 INJECTION, SOLUTION INTRAVASCULAR
Status: COMPLETED | OUTPATIENT
Start: 2024-11-01 | End: 2024-11-01

## 2024-11-01 RX ORDER — MAGNESIUM SULFATE IN WATER 40 MG/ML
2000 INJECTION, SOLUTION INTRAVENOUS PRN
Status: DISCONTINUED | OUTPATIENT
Start: 2024-11-01 | End: 2024-11-02 | Stop reason: HOSPADM

## 2024-11-01 RX ORDER — POTASSIUM CHLORIDE 7.45 MG/ML
10 INJECTION INTRAVENOUS PRN
Status: DISCONTINUED | OUTPATIENT
Start: 2024-11-01 | End: 2024-11-01 | Stop reason: SDUPTHER

## 2024-11-01 RX ORDER — ONDANSETRON 4 MG/1
4 TABLET, ORALLY DISINTEGRATING ORAL EVERY 8 HOURS PRN
Status: DISCONTINUED | OUTPATIENT
Start: 2024-11-01 | End: 2024-11-02 | Stop reason: HOSPADM

## 2024-11-01 RX ORDER — POLYETHYLENE GLYCOL 3350 17 G/17G
17 POWDER, FOR SOLUTION ORAL DAILY PRN
Status: DISCONTINUED | OUTPATIENT
Start: 2024-11-01 | End: 2024-11-02 | Stop reason: HOSPADM

## 2024-11-01 RX ORDER — POTASSIUM CHLORIDE 1500 MG/1
40 TABLET, EXTENDED RELEASE ORAL PRN
Status: DISCONTINUED | OUTPATIENT
Start: 2024-11-01 | End: 2024-11-02 | Stop reason: HOSPADM

## 2024-11-01 RX ORDER — LORAZEPAM 2 MG/ML
1 INJECTION INTRAMUSCULAR ONCE
Status: COMPLETED | OUTPATIENT
Start: 2024-11-01 | End: 2024-11-01

## 2024-11-01 RX ADMIN — SODIUM CHLORIDE: 9 INJECTION, SOLUTION INTRAVENOUS at 21:39

## 2024-11-01 RX ADMIN — IOPAMIDOL 80 ML: 755 INJECTION, SOLUTION INTRAVENOUS at 17:10

## 2024-11-01 RX ADMIN — LORAZEPAM 1 MG: 2 INJECTION INTRAMUSCULAR; INTRAVENOUS at 19:45

## 2024-11-01 RX ADMIN — SODIUM CHLORIDE, PRESERVATIVE FREE 10 ML: 5 INJECTION INTRAVENOUS at 21:43

## 2024-11-01 ASSESSMENT — ENCOUNTER SYMPTOMS
SORE THROAT: 0
ABDOMINAL PAIN: 0
NAUSEA: 0
VOMITING: 0
TROUBLE SWALLOWING: 0
PHOTOPHOBIA: 0
RHINORRHEA: 0
SHORTNESS OF BREATH: 0
COUGH: 0

## 2024-11-01 NOTE — ED NOTES
Dr Jain at bedside for reevaluation and update on POC. Pt VSS. Tele applied. [facility] completed.

## 2024-11-01 NOTE — ED PROVIDER NOTES
EMERGENCY DEPARTMENT ENCOUNTER    CHIEF COMPLAINT   Chief Complaint   Patient presents with    Eye Problem     left      HPI     Grazyna Dey is a 69 y.o. female who presents with dilated left pupil. This has been ongoing since Wednesday 48 to 72 hours ago last known normal. No acute vision change. Patient has decreased acuity due to left eye CRVO and glaucoma. She follows with [facility]. She seen on 10/23 for the CRVO and placed on ketoralac drops. Denies eye pain. She reprots episodes of right sided arm and leg weakness with parethesias lasting for minutes to hours over the last year. She will have occasional speech changes and then fatigue. Last episode 1 day ago and resolved. She has seen Neuro and they recommended and EEG.     REVIEW OF SYSTEMS   Eyes: No change in acuity. No redness. No pain.   Neurologic: No LOC or stiff neck   Cardiac: No Chest Pain, No syncope   Respiratory: No cough or difficulty breathing   GI: No Bloody Stool or Diarrhea   : No Dysuria or Hematuria   General: No Fever   All other systems reviewed and are negative.     PAST MEDICAL & SURGICAL HISTORY   Past Medical History:   Diagnosis Date    Chronic obstructive pulmonary disease, unspecified 5/22/2024    Glaucoma     right eye    Hypertension     Mixed hyperlipidemia 02/07/2023    Osteoarthritis of left glenohumeral joint 01/23/2023    Rheumatic fever age 12    Tobacco abuse       Past Surgical History:   Procedure Laterality Date    BRONCHOSCOPY N/A 4/18/2024    EBUS performed by Bertin Maldonado MD at [facility] ENDOSCOPY    COLONOSCOPY  2010    CT NEEDLE BIOPSY LUNG PERCUTANEOUS  5/15/2024    CT NEEDLE BIOPSY LUNG PERCUTANEOUS 5/15/2024 [facility] CT SCAN    SHOULDER ARTHROSCOPY Left 09/07/2018    TONSILLECTOMY      TUBAL LIGATION Bilateral 1987      CURRENT MEDICATIONS   Current Outpatient Rx   Medication Sig Dispense Refill    albuterol sulfate HFA (PROVENTIL HFA) 108 (90 Base)

## 2024-11-01 NOTE — CONSULTS
Neurology Consult Note    Date:11/1/2024       Room:[facility]-A  Patient Name:Grazyna Dey     YOB: 1955     Age:69 y.o.    Requesting Physician: No att. providers found     Reason for Consult:  Evaluate for NIH      Chief Complaint:   Chief Complaint   Patient presents with    Eye Problem     left       Subjective     Grazyna Dey is a 69 y.o. female with a history of COPD, current everyday 1ppd smoker, glaucoma in her right eye, CRVO/retinal hemorrhage - Left eye 9/12/24, HTN, HLD, OA, who presented to [facility] ED as directed by her optometrist for evaluation of left eye dilation. Patient states she was putting on her eye makeup 10/30/24 when she first noticed the dilation. She denies any new visual disturbances or pain. She does have a history of CRVO, retinal hemorrhage of her left eye 9/12/24, but denies any changes visually. She states she called her retinal specialist in [place], [place], who directed her to call her optometrist in [place], who then recommended evaluation in the ED for CTH/CTAs. She denies associated headaches, facial numbness/tingling, new extremity weakness or sensory deficits, difficulty speaking or understanding, or gait disturbances. She is currently following with [facility] neurology regarding intermittent episodes of right hand contracture, right upper and lower extremity jerking, speech arrest/starring, and right hand paresthesias with residual generalized fatigue over the past two years, with concern for seizures and planning for long term EEG monitoring to capture an event. No current extremity weakness or sensory deficits to light touch. Patient states she takes ASA 81 mg daily. She denies history of stroke or atrial fibrillation. She reports seeing [facility] neurology yesterday, who advised her to call her retinal specialist regarding the dilation of her left eye due to her recent history of CRVO/retinal hemorrhage. On-contrasted CTH revealed a subacute versus chronic infarct within  dosing when appropriate to reduce radiation to a low as reasonably achievable. Carotid stenosis and measurements are in accordance with NASCET criteria. 3D Post-processing was performed on this study.    CTA HEAD W WO CONTRAST    Result Date: 11/1/2024  ** ADDENDUM #1 ** Addendum: There is occlusion of the left internal carotid artery extending throughout its length. Please see dedicated CTA neck report. This document has been electronically signed by: Stefani Goldberg MD on 11/01/2024 05:48 PM ** ADDENDUM #2 ** This report was discussed with DR. SANCHEZ BUNN on Nov 01, 2024 18:00:00 EDT. This document has been electronically signed by: Lori London on 11/01/2024 06:00 PM ** ORIGINAL REPORT ** CT angiography head with contrast. 3D Postprocessing. Comparison: None Findings: The left A1 segment is diminutive in size with relatively limited flow (series 606 images 75-82) and there is limited flow within the proximal left A2 segment. Remaining intracranial arteries are patent. No aneurysm, dissection, or occlusion. No abnormal intracranial enhancement. No intra-axial mass, midline shift, hydrocephalus, or acute hemorrhage. There is no acute fracture.     1. The left A1 segment is diminutive with relatively limited flow and there appears to be limited flow within the proximal left A2 segment. This may be acute and/or congenital, but an acute process is also possible. This could be further evaluated with CT perfusion scan. This document has been electronically signed by: Stefani Goldberg MD on 11/01/2024 05:41 PM All CTs at this facility use dose modulation techniques and iterative reconstructions, and/or weight-based dosing when appropriate to reduce radiation to a low as reasonably achievable. 3D Post-processing was performed on this study.    CT Head W/O Contrast    Result Date: 11/1/2024  CT head without contrast Comparison: None Findings: No intra-axial mass, midline shift, hydrocephalus, or acute hemorrhage.

## 2024-11-01 NOTE — ED NOTES
Patient to ER via intake with c/o dilated left pupil. Pt reports they were told by [facility] to come to ER for head CT. Pt reports she noticed the pupil was dilated on Wednesday. Pt denies worsening visual changes, dizziness and weakness. Pt VSS. Pt is A&Ox4.

## 2024-11-02 VITALS
WEIGHT: 145.06 LBS | BODY MASS INDEX: 27.39 KG/M2 | HEIGHT: 61 IN | OXYGEN SATURATION: 94 % | RESPIRATION RATE: 16 BRPM | SYSTOLIC BLOOD PRESSURE: 117 MMHG | TEMPERATURE: 98.5 F | HEART RATE: 87 BPM | DIASTOLIC BLOOD PRESSURE: 57 MMHG

## 2024-11-02 PROBLEM — I65.22 CAROTID STENOSIS, LEFT: Status: ACTIVE | Noted: 2024-11-02

## 2024-11-02 PROBLEM — I65.22 INTERNAL CAROTID ARTERY OCCLUSION, LEFT: Status: ACTIVE | Noted: 2024-11-02

## 2024-11-02 PROBLEM — H57.02 ANISOCORIA: Status: ACTIVE | Noted: 2024-11-02

## 2024-11-02 LAB
ALBUMIN SERPL BCG-MCNC: 3.7 G/DL (ref 3.5–5.1)
ALP SERPL-CCNC: 44 U/L (ref 38–126)
ALT SERPL W/O P-5'-P-CCNC: 21 U/L (ref 11–66)
ANION GAP SERPL CALC-SCNC: 9 MEQ/L (ref 8–16)
AST SERPL-CCNC: 20 U/L (ref 5–40)
BASOPHILS ABSOLUTE: 0.1 THOU/MM3 (ref 0–0.1)
BASOPHILS NFR BLD AUTO: 0.9 %
BILIRUB SERPL-MCNC: 0.4 MG/DL (ref 0.3–1.2)
BUN SERPL-MCNC: 17 MG/DL (ref 7–22)
CALCIUM SERPL-MCNC: 9.1 MG/DL (ref 8.5–10.5)
CHLORIDE SERPL-SCNC: 103 MEQ/L (ref 98–111)
CO2 SERPL-SCNC: 27 MEQ/L (ref 23–33)
CREAT SERPL-MCNC: 1 MG/DL (ref 0.4–1.2)
DEPRECATED RDW RBC AUTO: 48.9 FL (ref 35–45)
EOSINOPHIL NFR BLD AUTO: 3.1 %
EOSINOPHILS ABSOLUTE: 0.2 THOU/MM3 (ref 0–0.4)
ERYTHROCYTE [DISTWIDTH] IN BLOOD BY AUTOMATED COUNT: 12.9 % (ref 11.5–14.5)
GFR SERPL CREATININE-BSD FRML MDRD: 62 ML/MIN/1.73M2
GLUCOSE SERPL-MCNC: 112 MG/DL (ref 70–108)
HCT VFR BLD AUTO: 43.1 % (ref 37–47)
HGB BLD-MCNC: 14.5 GM/DL (ref 12–16)
IMM GRANULOCYTES # BLD AUTO: 0.03 THOU/MM3 (ref 0–0.07)
IMM GRANULOCYTES NFR BLD AUTO: 0.4 %
LYMPHOCYTES ABSOLUTE: 3 THOU/MM3 (ref 1–4.8)
LYMPHOCYTES NFR BLD AUTO: 39.2 %
MCH RBC QN AUTO: 34.7 PG (ref 26–33)
MCHC RBC AUTO-ENTMCNC: 33.6 GM/DL (ref 32.2–35.5)
MCV RBC AUTO: 103.1 FL (ref 81–99)
MONOCYTES ABSOLUTE: 0.7 THOU/MM3 (ref 0.4–1.3)
MONOCYTES NFR BLD AUTO: 8.9 %
NEUTROPHILS ABSOLUTE: 3.6 THOU/MM3 (ref 1.8–7.7)
NEUTROPHILS NFR BLD AUTO: 47.5 %
NRBC BLD AUTO-RTO: 0 /100 WBC
PLATELET # BLD AUTO: 197 THOU/MM3 (ref 130–400)
PMV BLD AUTO: 11.2 FL (ref 9.4–12.4)
POTASSIUM SERPL-SCNC: 4.6 MEQ/L (ref 3.5–5.2)
PROT SERPL-MCNC: 6.1 G/DL (ref 6.1–8)
RBC # BLD AUTO: 4.18 MILL/MM3 (ref 4.2–5.4)
SODIUM SERPL-SCNC: 139 MEQ/L (ref 135–145)
WBC # BLD AUTO: 7.6 THOU/MM3 (ref 4.8–10.8)

## 2024-11-02 PROCEDURE — 99223 1ST HOSP IP/OBS HIGH 75: CPT

## 2024-11-02 PROCEDURE — 36415 COLL VENOUS BLD VENIPUNCTURE: CPT

## 2024-11-02 PROCEDURE — 85025 COMPLETE CBC W/AUTO DIFF WBC: CPT

## 2024-11-02 PROCEDURE — 80053 COMPREHEN METABOLIC PANEL: CPT

## 2024-11-02 RX ORDER — CLOPIDOGREL BISULFATE 75 MG/1
75 TABLET ORAL DAILY
Qty: 30 TABLET | Refills: 0 | Status: SHIPPED | OUTPATIENT
Start: 2024-11-02 | End: 2024-11-02 | Stop reason: HOSPADM

## 2024-11-02 ASSESSMENT — PAIN SCALES - GENERAL
PAINLEVEL_OUTOF10: 0

## 2024-11-02 NOTE — H&P
History & Physical  Internal Medicine Resident         Patient: Grazyna Dey 69 y.o. female      : 1955  Date of Admission: 2024  Date of Service: Pt seen/examined on 24 and Admitted to Inpatient with expected LOS greater than two midnights due to medical therapy.       ASSESSMENT AND PLAN      Unilateral Pupillary Dilatation   -history of glaucoma and CRVA left eye   -Significant Pupillary enlargement in Left eye   -See's Dr. Gomez Ophthalmology in [phone]   -Symptoms started on Wednesday   -Was advised to go to the ED for further evaluation Optho concerned for Aneurysm   - Chronic distorted vision due to glaucoma r-eye and CRVO L-eye   - Has weakness on the right side RUE/RLE which is not new   - Patient saw a neurologist yesterday at [facility] for evaluation of Seizure vs Stroke   -MRI brain ordered; Pending   - CT Neck Complete occlusion in left sided carotid/ stenosis in right internal carotid 45%  - Consider CV surgery consultation for carotid endarterectomy  Patient has a history of hypertension and smoking 1 pack of cigarettes per day over 50 years-  -neurochecks every 4 hours  -Neurology consulted from the ED recommended MRI  -Continue cardiac monitoring    Stroke rule out versus seizures  -patient was seen by neurologist yesterday at [facility]  -Concern for stroke versus seizures  Patient has been having right sided weakness which is not acute  -Patient also has left upper extremity weakness but has a bad shoulder  -Patient had episodes of aphasia which was concerning for seizures versus stroke unclear of etiology  - patient scheduled for a EEG on  at [facility] in [place]  -MRI ordered; pending  -No previous history of stroke or seizures  -CT head; small focus of subacute versus chronic infarction within left parietal lobe  -Drinks alcohol 2 shots of rum before bedtime  -History of glaucoma and CRVO  -Neurochecks every 4 hours.  -Neurology consulted from the ED recommended  MRI  -Continue cardiac monitoring  -Bedside swallow  -Last echocardiogram 8/24/2023 EF 55 to 60%        Nicotine dependence  -History of smoking 1 pack of cigarettes per day x 50 years  -Nicotine patch          Chronic Conditions (reviewed and stable unless otherwise stated)   Chronic hypertension  Rheumatic heart disease in the past  Chronic arthritis  Chronic lung nodule  Glaucoma right eye  CRVO left eye  Nicotine dependence      Data reviewed (unless otherwise discussed in assessment/plan)  EKG: Results pending pending  Imaging: CT head and neck  Labs: Reviewed, see chart and plan above.       =======================================================================    SUBJECTIVE    Chief Complaint: Left-sided pupillary dilation history of CRVO left eye, stroke versus seizures    History Of Present Illness:  Grazyna Dey is a 69 y.o. female with PMHx of chronic hypertension, rheumatic heart disease, chronic arthritis, glaucoma, nicotine dependence who presented to our emergency department for further evaluation for a left-sided pupillary dilation which began on Wednesday.  Patient was advised by her ophthalmologist and neurologist to come to the emergency department for further evaluation.  Patient was seen by her neurologist yesterday for suspicion of stroke versus seizures.  Patient has been having symptoms of right-sided weakness as well as left upper extremity weakness and episodes of aphasia in the last week or so.  Patient has no previous history of any seizure activity or strokes.  She does smoke a pack of cigarettes per day for over 50 years.  Her vision is chronically distorted due to her glaucoma on the right side and CRVO on the left eye.    On physical lamination the emergency department patient is sitting upright at bedside appears comfortable in no acute distress.  Patient denies any complains of any nausea, vomiting, chest pain, shortness of breath or abdominal pain.  Patient mentions that her

## 2024-11-02 NOTE — DISCHARGE SUMMARY
Discharge Summary     Patient Identification:  Grazyna Dey  : 1955  MRN: [id number]   Account: [id number]     Admit date: 2024  Discharge date: 24   Attending provider: Eduardo Kwon MD        Primary care provider: Nilad Sherman MD     Discharge Diagnoses:   Principal Problem:    Stroke-like symptom  Resolved Problems:    * No resolved hospital problems. *    Unilateral pupillary dilation, left: Per patient, acute onset.  Pertinent history of CRVO of left eye, noted in 2024.  Follows with ophthalmology and retinal specialist.  Symptom onset 10/30/2024.  Patient does take daily eyedrops.  CT head, CTA head and neck, MRI of brain and MRI of cervical spine obtained, no acute findings.  -Has movement of cranial nerve.  Patient's pupil nonreactive to light.  -Patient to follow-up with retinal specialist as soon as possible for further evaluation and treatment.  Patient understands agrees this plan.  Patient will follow-up with her retinal specialist  -No acute infarct on imaging.  Neurology has evaluated the patient    Intermittent right-sided weakness: Chronic, has been going on for the past 2 years.  Patient follows with neurology at [facility] in [place].  Imaging was obtained showed no acute etiology.  -Continue ASA  -Follow-up with PCP for further evaluation and possible referral to neurosurgery    Chronic occlusion, left ICA: Patient has follow-up with neuro interventionalists in approximately 6 months.  Patient to obtain imaging of head and neck prior  -Continue ASA.  Neurology does not recommend starting Plavix at this time.  This has been relayed and discussed with the patient.    Chronic Conditions (reviewed and stable unless otherwise stated)   Chronic hypertension  Rheumatic heart disease in the past  Chronic arthritis  Chronic lung nodule  Glaucoma right eye  CRVO left eye  Nicotine dependence     Hospital Course:   Grazyna Dey is a 69 y.o. female admitted to [facility]

## 2024-11-02 NOTE — PROGRESS NOTES
Stroke Folder given.   What is Stroke/CVA  Signs and Symptoms of stroke (BEFAST)  Treatments for Stroke  Personal Risk Factors for Stroke discussed  Education--Call 911     Patient/family has been educated on their personal risk factors of:  (X)Hypertension  (X)Hyperlipidemia  (X)smoking cessation      They have been given hand outs on the following medications:  (X) asa    Treatment for stroke includes:  Risk factor modifications  Following the medication regime prescribed by physician      Educated on BEFAST-Balance-Eyesight-Face-Arm-Speech-Time    A stroke is a brain attack.Stroke is a brain injury. It occurs when the brain's blood supply is interrupted. Blood carries oxygen and nutrients to the brain. Without oxygen and nutrients from blood, brain tissue starts to die rapidly. This can happen in less than 10 minutes.    A stroke occurs when blood flow to the brain is blocked (called ischemic stroke). This is caused by one of the following:   Sudden decreased blood flow   Damage to a blood vessel supplying blood to the brain can occur suddenly from either:   Injury   A clot that forms and breaks off from another part of the body (such as the heart or neck)   There are certain conditions which predispose people to form blood clots, such as:   Cancer   Pregnancy   Atrial fibrillation   Certain autoimmune diseases   Local blood clot   A build-up of fatty substances ( atherosclerotic plaque ) along the inner lining of the artery causes:   Narrowing of artery   Reduced elasticity   Local inflammation   Blood protein defects leading to increased clotting tendency   Decreased blood flow in the artery   Clot in an artery supplying the brain   Inflammatory conditions in the blood vessels (vasculitis)   A stroke may also occur if a blood vessel breaks and bleeds into or around the brain. This is called hemorrhagic stroke.      This condition needs to be monitored closely. Be sure to keep all appointments. Have exams and  blood tests done as directed.     Call 911 If Any of the Following Occurs   It is important that you and those around you know the warning signs for stroke. CALL 911 immediately if you have any of the following which may suggest a new stroke:   Sudden weakness or numbness of face, arm, or leg, especially on one side of the body   Sudden confusion   Sudden trouble speaking or understanding   Sudden trouble seeing in one or both eyes   Sudden dizziness, trouble walking, loss of balance, or coordination   Sudden severe headache with no known cause   If you think you have an emergency, CALL 911       To help reduce your risk of stroke, take the following steps:   Eat a well-balanced diet. The DASH diet rich in fruits, vegetables and low-fat dairy foods, and low in saturated fat, total fat, and cholesterolmay help keep your blood pressure in the healthy range.   Exercise regularly.   Maintain a healthy weight. (Your body mass index should be below 25.)   If you smoke, quit .   Drink alcohol in moderation. Moderate is two or fewer drinks per day for men and one or fewer drinks per day for women and older adults.  Control your diabetes    All patient/family questions were answered and teach back method was utilized.

## 2024-11-02 NOTE — PLAN OF CARE
Problem: Discharge Planning  Goal: Discharge to home or other facility with appropriate resources  11/2/2024 0148 by Faisal Roy RN  Outcome: Progressing  Flowsheets (Taken 11/2/2024 0148)  Discharge to home or other facility with appropriate resources:   Identify barriers to discharge with patient and caregiver   Arrange for needed discharge resources and transportation as appropriate   Identify discharge learning needs (meds, wound care, etc)     Problem: Neurosensory - Adult  Goal: Achieves stable or improved neurological status  11/2/2024 0148 by Faisal Roy RN  Outcome: Progressing  Flowsheets (Taken 11/2/2024 0148)  Achieves stable or improved neurological status: Assess for and report changes in neurological status     Problem: Neurosensory - Adult  Goal: Achieves maximal functionality and self care  11/2/2024 0148 by Faisal Roy RN  Outcome: Progressing  Flowsheets (Taken 11/2/2024 0148)  Achieves maximal functionality and self care: Encourage visually impaired, hearing impaired and aphasic patients to use assistive/communication devices     Problem: Safety - Adult  Goal: Free from fall injury  11/2/2024 0148 by Faisal Roy RN  Outcome: Progressing  Flowsheets (Taken 11/2/2024 0148)  Free From Fall Injury: Instruct family/caregiver on patient safety     Problem: Pain  Goal: Verbalizes/displays adequate comfort level or baseline comfort level  11/2/2024 0148 by Faisal Roy RN  Outcome: Progressing  Flowsheets (Taken 11/2/2024 0148)  Verbalizes/displays adequate comfort level or baseline comfort level:   Encourage patient to monitor pain and request assistance   Assess pain using appropriate pain scale   Implement non-pharmacological measures as appropriate and evaluate response   Administer analgesics based on type and severity of pain and evaluate response   Consider cultural and social influences on pain and pain management    Notify Licensed Independent Practitioner if interventions unsuccessful or patient reports new pain

## 2024-11-02 NOTE — ED NOTES
Called 4A and spoke with Mya who approved patient transport to [facility]. Patient is in stable condition.

## 2024-11-02 NOTE — ED NOTES
Spoke to  who approved patient transfer to [facility]. Patient transported upstairs in stable condition.

## 2024-11-02 NOTE — PROGRESS NOTES
Patient admitted to 4A Room 4 via cart/stretcher  Vital signs obtained. Assessment and data collection initiated. Oriented to room. Policies and procedures for 4a explained All questions answered with no further questions at this time. Fall prevention and safety brochure discussed with patient. 2 person skin check completed.    Name & number of designated person to update during visitor restriction times:     Was swallow screen completed on admission? [x] YES or [] NO  If patient failed swallow test, obtain order for speech therapy consult and keep NPO.

## 2024-11-02 NOTE — PROGRESS NOTES
Discharge teaching and instructions for diagnosis/procedure of unilateral pupillary dilatation completed with patient using teachback method. AVS reviewed. Printed prescriptions given to patient. Patient voiced understanding regarding prescriptions, follow up appointments, and care of self at home. Discharged from home to  independent living per self.

## 2024-11-02 NOTE — PLAN OF CARE
Neurology following peripherally, pending MRI brain and cervical spine. MRI brain WO negative for acute intracranial abnormalities, revealed chronic infarct in the left parietal lobe. MRI cervical spine revealed degenerative changes, most significant at C4-5 and C5-6, causing central and foraminal stenosis and probable bilateral nerve root impingement.     Please refer to neurology note dated 11/1/24 for further recommendations. Additionally, continue to follow with [facility] neurology for episodes of transient right hand dysfunction/paresthesias, speech disturbances, and right knee paresthesias. Patient scheduled for EEG monitoring 11/12/24 for further evaluation. Will defer continued management to [facility] neurology at this time.     Due to lack of acute stroke and chronicity of left ICA occlusion, no need for addition of Plavix at this time. Follow-up in outpatient neuro interventional clinic in 6 months with a repeat CTA of the head and neck for further evaluation. Our office will contact patient to schedule imaging and clinic visit. Continue ASA 81 mg daily.     Emergently report to the ED if experiencing any new neurologic/stroke-like symptoms for further evaluation. Patient expressed verbal understanding.    Inpatient neurologic work-up completed at this time. Neurology will sign off. Please call with any additional questions or concerns. Thank you for this consult.     This was discussed with Dr. Fox and he is in agreement with the assessment and plan.    Electronically signed by Cosmo Alicea PA-C on 11/2/24 at 2:17 PM EDT

## 2024-11-02 NOTE — DISCHARGE INSTRUCTIONS
Follow up with your ophthalmologist and retina specialist as soon as possible for further evaluation and work up of dilated pupil. Update, do not intiate Plavix 75mg daily

## 2024-11-02 NOTE — ED NOTES
ED to inpatient nurses report      Chief Complaint:  Chief Complaint   Patient presents with    Eye Problem     left     Present to ED from: home by private vehicle    MOA:     LOC: alert and orientated to name, place, date  Mobility: Requires assistance * 1  Oxygen Baseline: room air    Current needs required: room air     Code Status:   Full Code    What abnormal results were found and what did you give/do to treat them? Left pupil dilated, LKW Wednesday, left carotid completely occluded.   Any procedures or intervention occur? CT, labs, MRI, ativan prior to MRI     Mental Status:  Level of Consciousness: Alert (0)    Psych Assessment:    No risk - denies SI/HI    Vitals:  Patient Vitals for the past 24 hrs:   BP Temp Temp src Pulse Resp SpO2 Weight   11/01/24 1942 (!) 153/74 -- -- 90 18 -- --   11/01/24 1743 (!) 143/90 -- -- 89 16 96 % --   11/01/24 1545 (!) 150/69 -- -- -- -- 100 % --   11/01/24 1531 (!) 153/71 98.5 °F (36.9 °C) Oral 97 17 98 % 62.1 kg (137 lb)        LDAs:   Peripheral IV 11/01/24 Right Antecubital (Active)       Ambulatory Status:  No data recorded    Diagnosis:  DISPOSITION Admitted 11/01/2024 08:01:08 PM   Final diagnoses:   Anisocoria   Carotid stenosis, left        Consults:  IP CONSULT TO NEUROLOGY  IP CONSULT TO NEUROLOGY     Pain Score:   No pain    C-SSRS:   Risk of Suicide: No Risk      [place] Fall Risk:   Low risk    Swallow Screening        Preferred Language:   English      ALLERGIES     Codeine, Hydrocodone-acetaminophen, and Vicodin [hydrocodone-acetaminophen]    SURGICAL HISTORY       Past Surgical History:   Procedure Laterality Date    BRONCHOSCOPY N/A 4/18/2024    EBUS performed by Bertin Maldonado MD at [facility] ENDOSCOPY    COLONOSCOPY  2010    CT NEEDLE BIOPSY LUNG PERCUTANEOUS  5/15/2024    CT NEEDLE BIOPSY LUNG PERCUTANEOUS 5/15/2024 [facility] CT SCAN    SHOULDER ARTHROSCOPY Left 09/07/2018    TONSILLECTOMY      TUBAL LIGATION Bilateral 1987       PAST MEDICAL HISTORY

## 2024-11-04 ENCOUNTER — TELEPHONE (OUTPATIENT)
Dept: NEUROLOGY | Age: 69
End: 2024-11-04

## 2024-11-04 ENCOUNTER — TELEPHONE (OUTPATIENT)
Dept: FAMILY MEDICINE CLINIC | Age: 69
End: 2024-11-04

## 2024-11-04 NOTE — TELEPHONE ENCOUNTER
----- Message from Cosmo Alicea PA-C sent at 11/2/2024 10:55 AM EDT -----  Juventino Romero, this patient will need follow-up with Dr. Fox/Shivam in 6 months for left ICA occlusion. They need a CTA of their head and neck prior. Thanks.    Cosmo

## 2024-11-04 NOTE — TELEPHONE ENCOUNTER
Appointments scheduled at this time. Spoke with patient to inform her of imaging and office visit appointments. Instructions given. No further questions or concerns at this time.

## 2024-11-04 NOTE — TELEPHONE ENCOUNTER
Care Transitions Initial Follow Up Call    Outreach made within 2 business days of discharge: Yes    Patient: Grazyna Dey Patient : 1955   MRN: [id number]  Reason for Admission: anisocoria   Discharge Date: 24       Spoke with: patient     Discharge department/facility: [facility] Interactive Patient Contact:  Was patient able to fill all prescriptions: Yes  Was patient instructed to bring all medications to the follow-up visit: Yes  Is patient taking all medications as directed in the discharge summary? Yes  Does patient understand their discharge instructions: Yes  Does patient have questions or concerns that need addressed prior to 7-14 day follow up office visit: no    Additional needs identified to be addressed with provider  No needs identified             Scheduled appointment with PCP within 7-14 days    Follow Up  Future Appointments   Date Time Provider Department Center   2024 10:00 AM Nilda Sherman MD [facility] DEP   2025  8:30 AM Nilda Sherman MD [facility]   2025  9:40 AM STR CT IMAGING RM1  [facility] OUT EXP STR Rad/Card   2025  9:00 AM Bertin Maldonado MD [facility] [place]   2025  9:20 AM STR CT IMAGING RM1  [facility] OUT EXP STR Rad/Card   2025  1:20 PM SCHEDULE, SRPX NEUROINTERVENTION [facility] NEURINT P - [place] Taylor Kocher, CMA (AAMA)     New glasses recommended;   The left eye has had a significant change     Keep regular appointments

## 2024-11-07 ENCOUNTER — OFFICE VISIT (OUTPATIENT)
Dept: FAMILY MEDICINE CLINIC | Age: 69
End: 2024-11-07

## 2024-11-07 VITALS
HEART RATE: 75 BPM | HEIGHT: 61 IN | OXYGEN SATURATION: 98 % | WEIGHT: 142.4 LBS | TEMPERATURE: 98.1 F | DIASTOLIC BLOOD PRESSURE: 84 MMHG | RESPIRATION RATE: 16 BRPM | BODY MASS INDEX: 26.88 KG/M2 | SYSTOLIC BLOOD PRESSURE: 132 MMHG

## 2024-11-07 DIAGNOSIS — E04.1 LEFT THYROID NODULE: ICD-10-CM

## 2024-11-07 DIAGNOSIS — I10 ESSENTIAL HYPERTENSION: ICD-10-CM

## 2024-11-07 DIAGNOSIS — I65.22 ICAO (INTERNAL CAROTID ARTERY OCCLUSION), LEFT: ICD-10-CM

## 2024-11-07 DIAGNOSIS — Z09 HOSPITAL DISCHARGE FOLLOW-UP: ICD-10-CM

## 2024-11-07 DIAGNOSIS — H57.02 ANISOCORIA: Primary | ICD-10-CM

## 2024-11-07 RX ORDER — LOSARTAN POTASSIUM 100 MG/1
100 TABLET ORAL DAILY
COMMUNITY
Start: 2024-11-07

## 2024-11-07 RX ORDER — KETOROLAC TROMETHAMINE 5 MG/ML
SOLUTION OPHTHALMIC
COMMUNITY
Start: 2024-09-12

## 2024-11-07 NOTE — PROGRESS NOTES
[facility]  [address]  [facility] [address]  Dept: [phone]  Loc: [phone]    11/7/2024    Chief Complaint   Patient presents with    Follow-Up from Hospital     Patient was discharged from [facility] on 11/2/2024. Patient states that she is still experiencing problems with her left eye.          Post-Discharge Transitional Care  Follow Up      Grazynasuyapa Dey   YOB: 1955    Date of Office Visit:  11/7/2024  Date of Hospital Admission: 11/1/24  Date of Hospital Discharge: 11/2/24  Risk of hospital readmission (high >=14%. Medium >=10%) :Readmission Risk Score: 8.8      Care management risk score Rising risk (score 2-5) and Complex Care (Scores >=6): No Risk Score On File     Non face to face  following discharge, date last encounter closed (first attempt may have been earlier): 11/04/2024    Call initiated 2 business days of discharge: Yes    ASSESSMENT/PLAN:     1. Anisocoria  2. ICAO (internal carotid artery occlusion), left  3. Left thyroid nodule  -     US HEAD NECK SOFT TISSUE THYROID; Future  4. Essential hypertension  5. Hospital discharge follow-up  -     MI DISCHARGE MEDS RECONCILED W/ CURRENT OUTPATIENT MED LIST    Patient will follow up with her retinal specialist as planned due to her anisocoria    Patient has follow up imaging scheduled in 6 months for her carotid stenosis/occlusion    Thyroid US ordered due to nodule noted on CT    Continue current meds for HTN.  Her BPs are stable.    Smoking cessation advised    Medical Decision Making: high complexity         Subjective:   HPI:      70 yo female admitted due to new onset left pupillary dilatation.  No other associated symptoms.  CTAs showed a chronic left ICA occlusion and mild right carotid stenosis.  No acute findings on MRI.  She is a smoker and has controlled HTN.  Thyroid nodule was noted incidentally on imaging.      Inpatient course: Discharge summary

## 2024-11-11 DIAGNOSIS — I10 ESSENTIAL HYPERTENSION: ICD-10-CM

## 2024-11-11 RX ORDER — LOSARTAN POTASSIUM 100 MG/1
100 TABLET ORAL DAILY
Qty: 90 TABLET | Refills: 3 | Status: SHIPPED | OUTPATIENT
Start: 2024-11-11

## 2024-11-11 NOTE — TELEPHONE ENCOUNTER
This medication refill is regarding a telephone request. Refill requested by patient.    Requested Prescriptions     Pending Prescriptions Disp Refills    losartan (COZAAR) 100 MG tablet 90 tablet 3     Sig: Take 1 tablet by mouth daily       Date of last visit: 11/7/2024   Date of next visit: 2/17/2025  Date of last refill: 8/15/24 for 100 mg 1/2 tablet daily; pt currently taking 100 mg daily and needs a new Rx.   Pharmacy Name: [facility]    Rx verified, ordered and set to ANKUSH RYAN

## 2024-11-20 ENCOUNTER — HOSPITAL ENCOUNTER (OUTPATIENT)
Dept: ULTRASOUND IMAGING | Age: 69
Discharge: HOME | End: 2024-11-20
Attending: FAMILY MEDICINE
Payer: MEDICARE

## 2024-11-20 DIAGNOSIS — E04.1 LEFT THYROID NODULE: ICD-10-CM

## 2024-11-20 PROCEDURE — 76536 US EXAM OF HEAD AND NECK: CPT

## 2024-11-22 ENCOUNTER — TELEPHONE (OUTPATIENT)
Dept: FAMILY MEDICINE CLINIC | Age: 69
End: 2024-11-22

## 2024-11-22 DIAGNOSIS — E04.2 MULTIPLE THYROID NODULES: Primary | ICD-10-CM

## 2024-11-22 NOTE — TELEPHONE ENCOUNTER
----- Message from Dr. Nilda Sherman MD sent at 11/21/2024  4:52 PM EST -----  Please notify the patient that her thyroid ultrasound shows a 1.6 cm nodule on the left thyroid for which an ultrasound guided fine-needle aspiration is recommended.  Please arrange this for her if she is agreeable.  There are other smaller nodules noted which should be followed up with a thyroid ultrasound in 1 year.  Please arrange this for her as well.  RONALD

## 2024-11-22 NOTE — TELEPHONE ENCOUNTER
Called and notified pt. Pt agreeable to both test. Biopsy order placed and faxed to IR and they will reach out to pt to schedule. Order faxed to [phone].    Pt advised to call the office back if she has not heard back from them in the next few days.     Also LM with IR at ext: 2020 and notified them that we have faxed this order.    1 yr thyroid US mailed to patient with scheduling information, they will also call her to schedule.

## 2024-11-25 ENCOUNTER — TELEPHONE (OUTPATIENT)
Dept: FAMILY MEDICINE CLINIC | Age: 69
End: 2024-11-25

## 2024-11-25 DIAGNOSIS — H35.82 OCULAR ISCHEMIC SYNDROME: Primary | ICD-10-CM

## 2024-11-25 NOTE — TELEPHONE ENCOUNTER
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  Ok to refer if the patient wants. CG

## 2024-11-25 NOTE — TELEPHONE ENCOUNTER
Message left on nurse СЕРГЕЙ.  Patient states that her eye doctor was supposed to be sending a report for CG to review and make recommendations based off of the documentation.  There is a note scanned to media dated 11/20/24.  Please review and advise.

## 2024-11-25 NOTE — TELEPHONE ENCOUNTER
Patient notified and agreeable. Referral placed in Epic, that dept will contact pt for scheduling.

## 2024-12-10 ENCOUNTER — HOSPITAL ENCOUNTER (OUTPATIENT)
Dept: ULTRASOUND IMAGING | Age: 69
Discharge: HOME | End: 2024-12-10
Payer: MEDICARE

## 2024-12-10 DIAGNOSIS — E04.2 MULTIPLE THYROID NODULES: ICD-10-CM

## 2024-12-10 PROCEDURE — 88172 CYTP DX EVAL FNA 1ST EA SITE: CPT

## 2024-12-10 PROCEDURE — 88173 CYTOPATH EVAL FNA REPORT: CPT

## 2024-12-10 PROCEDURE — 88177 CYTP FNA EVAL EA ADDL: CPT

## 2024-12-10 PROCEDURE — 76942 ECHO GUIDE FOR BIOPSY: CPT

## 2024-12-12 ENCOUNTER — TELEPHONE (OUTPATIENT)
Dept: FAMILY MEDICINE CLINIC | Age: 69
End: 2024-12-12

## 2024-12-12 DIAGNOSIS — E04.2 MULTIPLE THYROID NODULES: Primary | ICD-10-CM

## 2024-12-12 NOTE — TELEPHONE ENCOUNTER
----- Message from Dr. Nilda Sherman MD sent at 12/12/2024 12:14 PM EST -----  Please notify the patient that her pathology on her thyroid biopsy is benign.  No cancerous cells noted.  Repeat thyroid ultrasound in 1 year.  CG

## 2024-12-12 NOTE — TELEPHONE ENCOUNTER
Patient notified of CG response she verbalized understanding and thanked me.     Repeat US mailed to patient.

## 2025-02-04 ENCOUNTER — OFFICE VISIT (OUTPATIENT)
Age: 70
End: 2025-02-04
Payer: MEDICARE

## 2025-02-04 VITALS
WEIGHT: 141.4 LBS | BODY MASS INDEX: 26.7 KG/M2 | DIASTOLIC BLOOD PRESSURE: 74 MMHG | SYSTOLIC BLOOD PRESSURE: 162 MMHG | HEIGHT: 61 IN | HEART RATE: 84 BPM

## 2025-02-04 DIAGNOSIS — I65.23 BILATERAL CAROTID ARTERY STENOSIS: ICD-10-CM

## 2025-02-04 DIAGNOSIS — I73.01 RAYNAUD'S DISEASE WITH GANGRENE (HCC): Primary | ICD-10-CM

## 2025-02-04 PROCEDURE — 3077F SYST BP >= 140 MM HG: CPT | Performed by: SURGERY

## 2025-02-04 PROCEDURE — G8417 CALC BMI ABV UP PARAM F/U: HCPCS | Performed by: SURGERY

## 2025-02-04 PROCEDURE — 1123F ACP DISCUSS/DSCN MKR DOCD: CPT | Performed by: SURGERY

## 2025-02-04 PROCEDURE — 99203 OFFICE O/P NEW LOW 30 MIN: CPT | Performed by: SURGERY

## 2025-02-04 PROCEDURE — 3078F DIAST BP <80 MM HG: CPT | Performed by: SURGERY

## 2025-02-04 PROCEDURE — G8427 DOCREV CUR MEDS BY ELIG CLIN: HCPCS | Performed by: SURGERY

## 2025-02-04 PROCEDURE — 3017F COLORECTAL CA SCREEN DOC REV: CPT | Performed by: SURGERY

## 2025-02-04 PROCEDURE — 1090F PRES/ABSN URINE INCON ASSESS: CPT | Performed by: SURGERY

## 2025-02-04 PROCEDURE — 4004F PT TOBACCO SCREEN RCVD TLK: CPT | Performed by: SURGERY

## 2025-02-04 PROCEDURE — 1159F MED LIST DOCD IN RCRD: CPT | Performed by: SURGERY

## 2025-02-04 PROCEDURE — G8399 PT W/DXA RESULTS DOCUMENT: HCPCS | Performed by: SURGERY

## 2025-02-04 RX ORDER — LEVETIRACETAM 500 MG/1
TABLET ORAL
COMMUNITY

## 2025-02-04 RX ORDER — NIFEDIPINE 30 MG/1
30 TABLET, EXTENDED RELEASE ORAL DAILY
Qty: 90 TABLET | Refills: 1 | Status: SHIPPED | OUTPATIENT
Start: 2025-02-04

## 2025-02-04 NOTE — PROGRESS NOTES
BIOPSY LUNG PERCUTANEOUS 5/15/2024 [facility] CT SCAN    SHOULDER ARTHROSCOPY Left 09/07/2018    TONSILLECTOMY      TUBAL LIGATION Bilateral 1987    US ASP/INJ THYROID CYST  12/10/2024    US THYROID CYST ASPIRATION AND OR INJECTION 12/10/2024 Jayy Palacio MD [facility]      Review of Systems    Vitals:    02/04/25 1510 02/04/25 1512   BP: (!) 166/72 (!) 162/74   Site: Left Upper Arm Right Upper Arm   Position: Sitting Sitting   Cuff Size: Medium Adult Medium Adult   Pulse: 82 84   Weight: 64.1 kg (141 lb 6.4 oz)    Height: 1.549 m (5' 1\")           Physical Exam  On examination the patient has a palpable aortic pulse as well as palpable femoral, popliteal, dorsalis pedis and posterior tibial pulses bilaterally which are strong and equal.  Her toes are currently beet red or cyanotic.  This is very consistent with Raynaud's phenomenon.  She has the start of some necrotic tissue on the first toe medially at the nail fold and similarly on the third toe both on the left lower extremity.  There is no edema.    Assessment:  1. Raynaud's disease with gangrene (HCC)    2. Bilateral carotid artery stenosis          Plan:  At this point I think the best option is to start her on some nifedipine at 30 mg once daily extended release so that we can break some digital arterial spasm.  Of utmost importance is smoking cessation.  She smokes about a pack per day.  She understands that this can cause intense vasoconstriction and can worsen Raynaud's symptoms.  She is starting to have ulcerations because her digital arteries have really not released their spasms.  I would like to see her again in another 2 months to make certain that this is going well.  As for her carotid arteries we will follow them at a yearly interval.  No intervention is needed currently    Electronically signed by:  Jeevan De La Rosa MD

## 2025-02-04 NOTE — PATIENT INSTRUCTIONS
If you receive a survey asking about your care experience, please respond. Your answers will help ensure you receive high-quality care at this office. Thank you!    Your Medical Assistant today: Agustina DELGADO  Thank you for coming to our office! It was a pleasure to serve you.

## 2025-02-11 ENCOUNTER — LAB (OUTPATIENT)
Dept: LAB | Age: 70
End: 2025-02-11

## 2025-02-11 DIAGNOSIS — E78.2 MIXED HYPERLIPIDEMIA: ICD-10-CM

## 2025-02-11 DIAGNOSIS — I10 ESSENTIAL HYPERTENSION: ICD-10-CM

## 2025-02-11 DIAGNOSIS — R73.01 IFG (IMPAIRED FASTING GLUCOSE): ICD-10-CM

## 2025-02-11 LAB
ALBUMIN SERPL BCG-MCNC: 4.3 G/DL (ref 3.5–5.1)
ALP SERPL-CCNC: 47 U/L (ref 38–126)
ALT SERPL W/O P-5'-P-CCNC: 19 U/L (ref 11–66)
ANION GAP SERPL CALC-SCNC: 14 MEQ/L (ref 8–16)
AST SERPL-CCNC: 20 U/L (ref 5–40)
BASOPHILS ABSOLUTE: 0.1 THOU/MM3 (ref 0–0.1)
BASOPHILS NFR BLD AUTO: 0.7 %
BILIRUB SERPL-MCNC: 0.4 MG/DL (ref 0.3–1.2)
BUN SERPL-MCNC: 24 MG/DL (ref 7–22)
CALCIUM SERPL-MCNC: 9.3 MG/DL (ref 8.5–10.5)
CHLORIDE SERPL-SCNC: 98 MEQ/L (ref 98–111)
CHOLEST SERPL-MCNC: 173 MG/DL (ref 100–199)
CO2 SERPL-SCNC: 24 MEQ/L (ref 23–33)
CREAT SERPL-MCNC: 1 MG/DL (ref 0.4–1.2)
DEPRECATED MEAN GLUCOSE BLD GHB EST-ACNC: 132 MG/DL (ref 70–126)
DEPRECATED RDW RBC AUTO: 48.3 FL (ref 35–45)
EOSINOPHIL NFR BLD AUTO: 3.3 %
EOSINOPHILS ABSOLUTE: 0.3 THOU/MM3 (ref 0–0.4)
ERYTHROCYTE [DISTWIDTH] IN BLOOD BY AUTOMATED COUNT: 13.2 % (ref 11.5–14.5)
GFR SERPL CREATININE-BSD FRML MDRD: 61 ML/MIN/1.73M2
GLUCOSE SERPL-MCNC: 108 MG/DL (ref 70–108)
HBA1C MFR BLD HPLC: 6.4 % (ref 4.4–6.4)
HCT VFR BLD AUTO: 45.2 % (ref 37–47)
HDLC SERPL-MCNC: 53 MG/DL
HGB BLD-MCNC: 15.4 GM/DL (ref 12–16)
IMM GRANULOCYTES # BLD AUTO: 0.02 THOU/MM3 (ref 0–0.07)
IMM GRANULOCYTES NFR BLD AUTO: 0.3 %
LDLC SERPL CALC-MCNC: 77 MG/DL
LYMPHOCYTES ABSOLUTE: 2.3 THOU/MM3 (ref 1–4.8)
LYMPHOCYTES NFR BLD AUTO: 30.4 %
MCH RBC QN AUTO: 33.8 PG (ref 26–33)
MCHC RBC AUTO-ENTMCNC: 34.1 GM/DL (ref 32.2–35.5)
MCV RBC AUTO: 99.1 FL (ref 81–99)
MONOCYTES ABSOLUTE: 0.6 THOU/MM3 (ref 0.4–1.3)
MONOCYTES NFR BLD AUTO: 7.5 %
NEUTROPHILS ABSOLUTE: 4.4 THOU/MM3 (ref 1.8–7.7)
NEUTROPHILS NFR BLD AUTO: 57.8 %
NRBC BLD AUTO-RTO: 0 /100 WBC
PLATELET # BLD AUTO: 242 THOU/MM3 (ref 130–400)
PMV BLD AUTO: 11.2 FL (ref 9.4–12.4)
POTASSIUM SERPL-SCNC: 4.8 MEQ/L (ref 3.5–5.2)
PROT SERPL-MCNC: 7 G/DL (ref 6.1–8)
RBC # BLD AUTO: 4.56 MILL/MM3 (ref 4.2–5.4)
SODIUM SERPL-SCNC: 136 MEQ/L (ref 135–145)
TRIGL SERPL-MCNC: 217 MG/DL (ref 0–199)
WBC # BLD AUTO: 7.6 THOU/MM3 (ref 4.8–10.8)

## 2025-02-14 ASSESSMENT — PATIENT HEALTH QUESTIONNAIRE - PHQ9
1. LITTLE INTEREST OR PLEASURE IN DOING THINGS: NOT AT ALL
2. FEELING DOWN, DEPRESSED OR HOPELESS: NOT AT ALL
1. LITTLE INTEREST OR PLEASURE IN DOING THINGS: NOT AT ALL
SUM OF ALL RESPONSES TO PHQ QUESTIONS 1-9: 0
SUM OF ALL RESPONSES TO PHQ QUESTIONS 1-9: 0
2. FEELING DOWN, DEPRESSED OR HOPELESS: NOT AT ALL
SUM OF ALL RESPONSES TO PHQ9 QUESTIONS 1 & 2: 0
SUM OF ALL RESPONSES TO PHQ QUESTIONS 1-9: 0
SUM OF ALL RESPONSES TO PHQ QUESTIONS 1-9: 0
SUM OF ALL RESPONSES TO PHQ9 QUESTIONS 1 & 2: 0

## 2025-02-17 ENCOUNTER — OFFICE VISIT (OUTPATIENT)
Dept: FAMILY MEDICINE CLINIC | Age: 70
End: 2025-02-17

## 2025-02-17 VITALS
SYSTOLIC BLOOD PRESSURE: 106 MMHG | DIASTOLIC BLOOD PRESSURE: 66 MMHG | RESPIRATION RATE: 16 BRPM | BODY MASS INDEX: 26.91 KG/M2 | WEIGHT: 142.4 LBS | HEART RATE: 84 BPM | TEMPERATURE: 98.1 F

## 2025-02-17 DIAGNOSIS — R73.01 IFG (IMPAIRED FASTING GLUCOSE): ICD-10-CM

## 2025-02-17 DIAGNOSIS — Z12.31 ENCOUNTER FOR SCREENING MAMMOGRAM FOR MALIGNANT NEOPLASM OF BREAST: ICD-10-CM

## 2025-02-17 DIAGNOSIS — E78.2 MIXED HYPERLIPIDEMIA: ICD-10-CM

## 2025-02-17 DIAGNOSIS — Z87.891 PERSONAL HISTORY OF TOBACCO USE: ICD-10-CM

## 2025-02-17 DIAGNOSIS — I10 ESSENTIAL HYPERTENSION: Primary | ICD-10-CM

## 2025-02-17 PROBLEM — J44.9 CHRONIC OBSTRUCTIVE PULMONARY DISEASE, UNSPECIFIED (HCC): Status: RESOLVED | Noted: 2024-05-22 | Resolved: 2025-02-17

## 2025-02-17 RX ORDER — LISINOPRIL 2.5 MG/1
2.5 TABLET ORAL DAILY
COMMUNITY
End: 2025-02-17 | Stop reason: CLARIF

## 2025-02-17 ASSESSMENT — ENCOUNTER SYMPTOMS
SHORTNESS OF BREATH: 0
ABDOMINAL PAIN: 0
NAUSEA: 0
BLOOD IN STOOL: 0
VOMITING: 0
DIARRHEA: 0

## 2025-02-17 NOTE — PROGRESS NOTES
2025      Grazyna Dey (:  1955) is a 69 y.o. female,Established patient, here for evaluation of the following chief complaint(s):  6 Month Follow-Up (Here today for 6 month f/up for IFG, HTN and hyperlipidemia. Discuss recent labs. )        ASSESSMENT/PLAN     Assessment & Plan  1. Essential hypertension  2. IFG (impaired fasting glucose)  3. Mixed hyperlipidemia  4. Encounter for screening mammogram for malignant neoplasm of breast  -     RADHA CECY DIGITAL SCREEN BILATERAL; Future  5. Personal history of tobacco use      Continue losartan 100 mg daily for hypertension.  Nifedipine 30 mg recently added due to Raynaud's.  Her blood pressures are stable.  This is a chronic condition that stable and well-controlled.    Her fasting blood glucose level is 108, which is at the upper limit of the normal range. Her hemoglobin A1c is 6.4%, placing her on the borderline between prediabetes and diabetes. She is advised to adopt a diabetic diet, reducing intake of starches and carbohydrates such as bread, pasta, and potatoes. She should also avoid adding extra sugar to her beverages. Increasing physical activity is recommended to aid in weight loss and subsequently lower her blood glucose levels.  This is a chronic and stable conditions addressed today.      She is encouraged to start using nicotine patches to aid in smoking cessation.  This condition is chronic and uncontrolled.    Order given today for yearly mammogram for breast cancer screening    Low-fat diet and regular physical activity recommended for hyperlipidemia.  This condition is chronic and controlled.    Flu vaccine and Prevnar 20 recommended        Return in about 6 months (around 2025) for AWV.    SUBJECTIVE     Grazyna Dey is a 69 y.o.female      Here for follow up of chronic health problems including:    Patient Active Problem List   Diagnosis    Tobacco abuse    Essential hypertension    IFG (impaired fasting glucose)

## 2025-02-18 ENCOUNTER — HOSPITAL ENCOUNTER (OUTPATIENT)
Dept: CT IMAGING | Age: 70
Discharge: HOME | End: 2025-02-18
Attending: INTERNAL MEDICINE
Payer: MEDICARE

## 2025-02-18 DIAGNOSIS — R91.1 LUNG NODULE SEEN ON IMAGING STUDY: ICD-10-CM

## 2025-02-18 DIAGNOSIS — F17.200 SMOKER: ICD-10-CM

## 2025-02-18 DIAGNOSIS — J44.9 CHRONIC OBSTRUCTIVE PULMONARY DISEASE, UNSPECIFIED COPD TYPE (HCC): ICD-10-CM

## 2025-02-18 DIAGNOSIS — J41.1 MUCOPURULENT CHRONIC BRONCHITIS (HCC): ICD-10-CM

## 2025-02-18 PROCEDURE — 71250 CT THORAX DX C-: CPT

## 2025-02-26 ENCOUNTER — OFFICE VISIT (OUTPATIENT)
Dept: PULMONOLOGY | Age: 70
End: 2025-02-26
Payer: MEDICARE

## 2025-02-26 VITALS
BODY MASS INDEX: 26.43 KG/M2 | SYSTOLIC BLOOD PRESSURE: 116 MMHG | TEMPERATURE: 97.5 F | DIASTOLIC BLOOD PRESSURE: 76 MMHG | WEIGHT: 140 LBS | HEIGHT: 61 IN | HEART RATE: 86 BPM | OXYGEN SATURATION: 97 %

## 2025-02-26 DIAGNOSIS — J44.9 CHRONIC OBSTRUCTIVE PULMONARY DISEASE, UNSPECIFIED COPD TYPE (HCC): ICD-10-CM

## 2025-02-26 DIAGNOSIS — R91.1 LUNG NODULE: Primary | ICD-10-CM

## 2025-02-26 DIAGNOSIS — F17.200 SMOKER UNMOTIVATED TO QUIT: ICD-10-CM

## 2025-02-26 PROCEDURE — 1090F PRES/ABSN URINE INCON ASSESS: CPT | Performed by: INTERNAL MEDICINE

## 2025-02-26 PROCEDURE — 1123F ACP DISCUSS/DSCN MKR DOCD: CPT | Performed by: INTERNAL MEDICINE

## 2025-02-26 PROCEDURE — 99213 OFFICE O/P EST LOW 20 MIN: CPT | Performed by: INTERNAL MEDICINE

## 2025-02-26 PROCEDURE — 1159F MED LIST DOCD IN RCRD: CPT | Performed by: INTERNAL MEDICINE

## 2025-02-26 PROCEDURE — 3023F SPIROM DOC REV: CPT | Performed by: INTERNAL MEDICINE

## 2025-02-26 PROCEDURE — G8417 CALC BMI ABV UP PARAM F/U: HCPCS | Performed by: INTERNAL MEDICINE

## 2025-02-26 PROCEDURE — 3074F SYST BP LT 130 MM HG: CPT | Performed by: INTERNAL MEDICINE

## 2025-02-26 PROCEDURE — G8427 DOCREV CUR MEDS BY ELIG CLIN: HCPCS | Performed by: INTERNAL MEDICINE

## 2025-02-26 PROCEDURE — 4004F PT TOBACCO SCREEN RCVD TLK: CPT | Performed by: INTERNAL MEDICINE

## 2025-02-26 PROCEDURE — 3078F DIAST BP <80 MM HG: CPT | Performed by: INTERNAL MEDICINE

## 2025-02-26 PROCEDURE — G8399 PT W/DXA RESULTS DOCUMENT: HCPCS | Performed by: INTERNAL MEDICINE

## 2025-02-26 PROCEDURE — 3017F COLORECTAL CA SCREEN DOC REV: CPT | Performed by: INTERNAL MEDICINE

## 2025-02-26 ASSESSMENT — ENCOUNTER SYMPTOMS
WHEEZING: 1
CHOKING: 0
STRIDOR: 0
TROUBLE SWALLOWING: 0
APNEA: 0
CHEST TIGHTNESS: 0
SHORTNESS OF BREATH: 0
ABDOMINAL DISTENTION: 0
VOICE CHANGE: 0
COUGH: 1

## 2025-02-26 NOTE — PROGRESS NOTES
72, TLC 95%, DLCO 75%.    Patient continues smoking, coughing, producing yellowish sputum, chest tightness and dyspnea on exertion.      Previous notes  Patient is a 68-year-old heavy smoker, Dr. Sherman ordered a screening CT scan of the chest for early diagnosis of lung cancer according to guidelines and it was reported as a RADS 4A due to the presence of a 12 mm irregular soft tissue lung nodule located laterally in the left lower lobe abutting the fissure, there is another minute 3 mm left upper lobe lung nodule.  I the time of my visit a PET scan is pending.  The nodule is located along the fissure and next to the chest wall , the fissure is thickened patient denies ever having chest wall trauma, motor vehicle accident, fractured ribs      Surgical history: Tonsillectomy, BTL, shoulder arthroscopy.    Her father had lung cancer he was a smoker, [place] has never [place] any [place]  Review of Systems   Constitutional:  Negative for fatigue and unexpected weight change.   HENT:  Negative for trouble swallowing and voice change.    Respiratory:  Positive for cough and wheezing. Negative for apnea, choking, chest tightness, shortness of breath and stridor.    Gastrointestinal:  Negative for abdominal distention.   Endocrine: Negative.    Musculoskeletal:  Positive for arthralgias.   Neurological:  Negative for dizziness, weakness and light-headedness.   Hematological:  Negative for adenopathy. Does not bruise/bleed easily.   Psychiatric/Behavioral: Negative.           All other systems reviewed and are negative    Lung Nodule Screening     [x] Qualifies    [] Does not qualify   [] Declined   [] Completed  Past Medical History:   Diagnosis Date    Chronic obstructive pulmonary disease, unspecified 05/22/2024    Glaucoma     right eye    Hypertension     Mixed hyperlipidemia 02/07/2023    Osteoarthritis of left glenohumeral joint 01/23/2023    Raynaud disease 01/2025    Toes    Rheumatic fever age 12

## 2025-04-01 ENCOUNTER — HOSPITAL ENCOUNTER (OUTPATIENT)
Dept: MAMMOGRAPHY | Age: 70
Discharge: HOME | End: 2025-04-01
Payer: MEDICARE

## 2025-04-01 DIAGNOSIS — Z12.31 ENCOUNTER FOR SCREENING MAMMOGRAM FOR MALIGNANT NEOPLASM OF BREAST: ICD-10-CM

## 2025-04-01 PROCEDURE — 77063 BREAST TOMOSYNTHESIS BI: CPT

## 2025-04-08 ENCOUNTER — OFFICE VISIT (OUTPATIENT)
Age: 70
End: 2025-04-08
Payer: MEDICARE

## 2025-04-08 VITALS
DIASTOLIC BLOOD PRESSURE: 61 MMHG | SYSTOLIC BLOOD PRESSURE: 132 MMHG | WEIGHT: 141 LBS | BODY MASS INDEX: 26.62 KG/M2 | HEART RATE: 80 BPM | HEIGHT: 61 IN

## 2025-04-08 DIAGNOSIS — I65.22 INTERNAL CAROTID ARTERY OCCLUSION, LEFT: Primary | ICD-10-CM

## 2025-04-08 DIAGNOSIS — I73.01 RAYNAUD'S DISEASE WITH GANGRENE (HCC): ICD-10-CM

## 2025-04-08 PROCEDURE — 4004F PT TOBACCO SCREEN RCVD TLK: CPT | Performed by: SURGERY

## 2025-04-08 PROCEDURE — 99213 OFFICE O/P EST LOW 20 MIN: CPT | Performed by: SURGERY

## 2025-04-08 PROCEDURE — 1159F MED LIST DOCD IN RCRD: CPT | Performed by: SURGERY

## 2025-04-08 PROCEDURE — 1090F PRES/ABSN URINE INCON ASSESS: CPT | Performed by: SURGERY

## 2025-04-08 PROCEDURE — G8427 DOCREV CUR MEDS BY ELIG CLIN: HCPCS | Performed by: SURGERY

## 2025-04-08 PROCEDURE — 3075F SYST BP GE 130 - 139MM HG: CPT | Performed by: SURGERY

## 2025-04-08 PROCEDURE — G8417 CALC BMI ABV UP PARAM F/U: HCPCS | Performed by: SURGERY

## 2025-04-08 PROCEDURE — 3078F DIAST BP <80 MM HG: CPT | Performed by: SURGERY

## 2025-04-08 PROCEDURE — 3017F COLORECTAL CA SCREEN DOC REV: CPT | Performed by: SURGERY

## 2025-04-08 PROCEDURE — 1123F ACP DISCUSS/DSCN MKR DOCD: CPT | Performed by: SURGERY

## 2025-04-08 PROCEDURE — G8399 PT W/DXA RESULTS DOCUMENT: HCPCS | Performed by: SURGERY

## 2025-04-08 NOTE — PROGRESS NOTES
[facility] [place] [facility]  [facility]ASCULAR SURGERY  [address], [address]  [place] [address]  Dept: [phone]  Loc: [phone]     Patient: Grazyna Dey  : 1955  MRN: [id number]  DOS: 2025            HPI:  Grazyna Dey is a 69 y.o. female who returns to the office regarding her Raynaud's disease with ulceration and gangrene.  I am pleased to say that nifedipine did improve her condition with healed ulcers.  She has improved her smoking but has not stopped smoking.  Recall that she had initially seen me for left carotid occlusion.  The right carotid is not yet 50% narrowed.    Review of Systems    Vitals:    25 1142   BP: 132/61   BP Site: Right Upper Arm   Patient Position: Sitting   BP Cuff Size: Medium Adult   Pulse: 80   Weight: 64 kg (141 lb)   Height: 1.549 m (5' 1\")          Physical Exam  On exam she continues to have palpable dorsalis pedis and posterior tibial pulses bilaterally.  Her feet are warm and well-perfused and she has no further ulceration.    Assessment:  1. Internal carotid artery occlusion, left    2. Raynaud's disease with gangrene (HCC)          Plan:  At this point we will continue with nifedipine.  I will see her back to the office in another 6 months.  If at that time she has stopped smoking and her ulcers have not returned then I will stop nifedipine.    Electronically signed by:  Jeevan De La Rosa MD

## 2025-05-19 ENCOUNTER — HOSPITAL ENCOUNTER (OUTPATIENT)
Dept: CT IMAGING | Age: 70
Discharge: HOME | End: 2025-05-19
Payer: MEDICARE

## 2025-05-19 DIAGNOSIS — I65.22 INTERNAL CAROTID ARTERY OCCLUSION, LEFT: ICD-10-CM

## 2025-05-19 LAB — POC CREATININE WHOLE BLOOD: 1.1 MG/DL (ref 0.5–1.2)

## 2025-05-19 PROCEDURE — 82565 ASSAY OF CREATININE: CPT

## 2025-05-19 PROCEDURE — 70498 CT ANGIOGRAPHY NECK: CPT

## 2025-05-19 PROCEDURE — 6360000004 HC RX CONTRAST MEDICATION

## 2025-05-19 PROCEDURE — 70496 CT ANGIOGRAPHY HEAD: CPT

## 2025-05-19 RX ORDER — IOPAMIDOL 755 MG/ML
80 INJECTION, SOLUTION INTRAVASCULAR
Status: COMPLETED | OUTPATIENT
Start: 2025-05-19 | End: 2025-05-19

## 2025-05-19 RX ADMIN — IOPAMIDOL 80 ML: 755 INJECTION, SOLUTION INTRAVENOUS at 08:39

## 2025-05-27 ENCOUNTER — OFFICE VISIT (OUTPATIENT)
Dept: NEUROLOGY | Age: 70
End: 2025-05-27
Payer: MEDICARE

## 2025-05-27 VITALS
BODY MASS INDEX: 26.64 KG/M2 | DIASTOLIC BLOOD PRESSURE: 88 MMHG | HEIGHT: 61 IN | HEART RATE: 84 BPM | SYSTOLIC BLOOD PRESSURE: 170 MMHG | WEIGHT: 141.09 LBS

## 2025-05-27 DIAGNOSIS — I65.22 CAROTID OCCLUSION, LEFT: Primary | ICD-10-CM

## 2025-05-27 DIAGNOSIS — I65.21 CAROTID STENOSIS, RIGHT: ICD-10-CM

## 2025-05-27 PROCEDURE — 3017F COLORECTAL CA SCREEN DOC REV: CPT

## 2025-05-27 PROCEDURE — 1159F MED LIST DOCD IN RCRD: CPT

## 2025-05-27 PROCEDURE — 1160F RVW MEDS BY RX/DR IN RCRD: CPT

## 2025-05-27 PROCEDURE — G8399 PT W/DXA RESULTS DOCUMENT: HCPCS

## 2025-05-27 PROCEDURE — G8427 DOCREV CUR MEDS BY ELIG CLIN: HCPCS

## 2025-05-27 PROCEDURE — G8417 CALC BMI ABV UP PARAM F/U: HCPCS

## 2025-05-27 PROCEDURE — 1090F PRES/ABSN URINE INCON ASSESS: CPT

## 2025-05-27 PROCEDURE — 1123F ACP DISCUSS/DSCN MKR DOCD: CPT

## 2025-05-27 PROCEDURE — 3077F SYST BP >= 140 MM HG: CPT

## 2025-05-27 PROCEDURE — 99214 OFFICE O/P EST MOD 30 MIN: CPT

## 2025-05-27 PROCEDURE — 4004F PT TOBACCO SCREEN RCVD TLK: CPT

## 2025-05-27 PROCEDURE — 3078F DIAST BP <80 MM HG: CPT

## 2025-05-27 ASSESSMENT — ENCOUNTER SYMPTOMS
ABDOMINAL PAIN: 0
TROUBLE SWALLOWING: 0
SHORTNESS OF BREATH: 0

## 2025-05-27 ASSESSMENT — VISUAL ACUITY: METHOD_CF: 1

## 2025-05-27 NOTE — PROGRESS NOTES
Neurointerventional Office Note    Date:5/27/25  Patient Name:Grazyna Dey     YOB: 1955     Age:69 y.o.    Reason for visit:  monitoring  of Right carotid stenosis and left ICA occlusion    Chief Complaint:   Chief Complaint   Patient presents with    Follow-up     6 mo follow up       Subjective   Grazyna Dey is a 69 y.o. female who presents to the interventional neurology clinic for a 6-month follow-up for chronic left ICA occlusion. Her pmhx includes  history of COPD, current everyday 1ppd smoker, CRVO/retinal hemorrhage - Left eye 9/12/24, HTN, HLD, prior left parietal stroke, Raynaud's and osteoarthritis.  Patient overall has been doing well since she was last seen.  She notes that she is a chronic smoker but has been trying to cut down. She also reports a \"shaky, creepy feeling\" in her right hand in the past which has improved with Keppra (from her primary neurologist). She notes ongoing decreased visual acuity in her left eye. She denies weakness, numbness, speech difficulty and headaches. She has no new complaints at this time.     Review of Systems   Review of Systems   Constitutional:  Negative for chills, fatigue and fever.   HENT:  Negative for trouble swallowing.    Eyes:  Positive for visual disturbance (blurry vision left eye).   Respiratory:  Negative for shortness of breath.    Cardiovascular:  Negative for chest pain and palpitations.   Gastrointestinal:  Negative for abdominal pain.   Musculoskeletal:  Negative for arthralgias and myalgias.   Skin:  Negative for wound.   Neurological:  Negative for dizziness, facial asymmetry, speech difficulty, weakness, light-headedness and headaches.   Psychiatric/Behavioral:  Negative for confusion.        Medications     Current Outpatient Medications on File Prior to Visit   Medication Sig Dispense Refill    levETIRAcetam (KEPPRA) 500 MG tablet TAKE 1 TABLET BY MOUTH AT BEDTIME FOR 1 WEEK, THEN 1 TABLET TWICE DAILY      NIFEdipine

## 2025-07-28 ENCOUNTER — TELEPHONE (OUTPATIENT)
Dept: FAMILY MEDICINE CLINIC | Age: 70
End: 2025-07-28

## 2025-07-28 NOTE — TELEPHONE ENCOUNTER
----- Message from Luis M RENNY sent at 7/28/2025  8:54 AM EDT -----  Regarding: ECC Message to Provider  ECC Message to Provider    Relationship to Patient: Self     Additional Information Patient called in asking if she will be needing to have blood work prior to her appointment on 08/19/25.    --------------------------------------------------------------------------------------------------------------------------    Call Back Information: OK to leave message on voicemail  Preferred Call Back Number: Phone [phone]

## 2025-07-28 NOTE — TELEPHONE ENCOUNTER
Patient will be due for hemoglobin A1c due to impaired fasting glucose.  She can either do that at the office during her visit or can do it at a lab beforehand.  CG

## 2025-08-11 RX ORDER — NIFEDIPINE 30 MG/1
30 TABLET, EXTENDED RELEASE ORAL DAILY
Qty: 90 TABLET | Refills: 0 | Status: SHIPPED | OUTPATIENT
Start: 2025-08-11

## 2025-08-12 RX ORDER — NIFEDIPINE 30 MG/1
30 TABLET, EXTENDED RELEASE ORAL DAILY
Qty: 90 TABLET | Refills: 0 | OUTPATIENT
Start: 2025-08-12

## 2025-08-19 ENCOUNTER — OFFICE VISIT (OUTPATIENT)
Dept: FAMILY MEDICINE CLINIC | Age: 70
End: 2025-08-19
Payer: MEDICARE

## 2025-08-19 VITALS
TEMPERATURE: 98.1 F | DIASTOLIC BLOOD PRESSURE: 70 MMHG | HEART RATE: 84 BPM | BODY MASS INDEX: 26.62 KG/M2 | WEIGHT: 141 LBS | RESPIRATION RATE: 16 BRPM | SYSTOLIC BLOOD PRESSURE: 110 MMHG | HEIGHT: 61 IN

## 2025-08-19 DIAGNOSIS — I10 ESSENTIAL HYPERTENSION: ICD-10-CM

## 2025-08-19 DIAGNOSIS — E78.2 MIXED HYPERLIPIDEMIA: ICD-10-CM

## 2025-08-19 DIAGNOSIS — R73.01 IFG (IMPAIRED FASTING GLUCOSE): ICD-10-CM

## 2025-08-19 DIAGNOSIS — Z00.00 MEDICARE ANNUAL WELLNESS VISIT, SUBSEQUENT: Primary | ICD-10-CM

## 2025-08-19 LAB — HBA1C MFR BLD: 5.9 %

## 2025-08-19 PROCEDURE — 1123F ACP DISCUSS/DSCN MKR DOCD: CPT | Performed by: FAMILY MEDICINE

## 2025-08-19 PROCEDURE — 1160F RVW MEDS BY RX/DR IN RCRD: CPT | Performed by: FAMILY MEDICINE

## 2025-08-19 PROCEDURE — 3017F COLORECTAL CA SCREEN DOC REV: CPT | Performed by: FAMILY MEDICINE

## 2025-08-19 PROCEDURE — 3074F SYST BP LT 130 MM HG: CPT | Performed by: FAMILY MEDICINE

## 2025-08-19 PROCEDURE — 83036 HEMOGLOBIN GLYCOSYLATED A1C: CPT | Performed by: FAMILY MEDICINE

## 2025-08-19 PROCEDURE — 1159F MED LIST DOCD IN RCRD: CPT | Performed by: FAMILY MEDICINE

## 2025-08-19 PROCEDURE — 3078F DIAST BP <80 MM HG: CPT | Performed by: FAMILY MEDICINE

## 2025-08-19 PROCEDURE — G0439 PPPS, SUBSEQ VISIT: HCPCS | Performed by: FAMILY MEDICINE

## 2025-08-19 ASSESSMENT — LIFESTYLE VARIABLES
HOW OFTEN DURING THE LAST YEAR HAVE YOU HAD A FEELING OF GUILT OR REMORSE AFTER DRINKING: NEVER
HOW OFTEN DO YOU HAVE A DRINK CONTAINING ALCOHOL: 4 OR MORE TIMES A WEEK
HOW OFTEN DURING THE LAST YEAR HAVE YOU NEEDED AN ALCOHOLIC DRINK FIRST THING IN THE MORNING TO GET YOURSELF GOING AFTER A NIGHT OF HEAVY DRINKING: NEVER
HOW OFTEN DURING THE LAST YEAR HAVE YOU FAILED TO DO WHAT WAS NORMALLY EXPECTED FROM YOU BECAUSE OF DRINKING: NEVER
HAVE YOU OR SOMEONE ELSE BEEN INJURED AS A RESULT OF YOUR DRINKING: NO
HOW OFTEN DURING THE LAST YEAR HAVE YOU FOUND THAT YOU WERE NOT ABLE TO STOP DRINKING ONCE YOU HAD STARTED: NEVER
HOW OFTEN DURING THE LAST YEAR HAVE YOU BEEN UNABLE TO REMEMBER WHAT HAPPENED THE NIGHT BEFORE BECAUSE YOU HAD BEEN DRINKING: NEVER
HOW MANY STANDARD DRINKS CONTAINING ALCOHOL DO YOU HAVE ON A TYPICAL DAY: 1 OR 2
HAS A RELATIVE, FRIEND, DOCTOR, OR ANOTHER HEALTH PROFESSIONAL EXPRESSED CONCERN ABOUT YOUR DRINKING OR SUGGESTED YOU CUT DOWN: NO

## 2025-08-19 ASSESSMENT — PATIENT HEALTH QUESTIONNAIRE - PHQ9
SUM OF ALL RESPONSES TO PHQ QUESTIONS 1-9: 0
2. FEELING DOWN, DEPRESSED OR HOPELESS: NOT AT ALL
SUM OF ALL RESPONSES TO PHQ QUESTIONS 1-9: 0
1. LITTLE INTEREST OR PLEASURE IN DOING THINGS: NOT AT ALL

## 2025-08-21 ENCOUNTER — HOSPITAL ENCOUNTER (OUTPATIENT)
Dept: PULMONOLOGY | Age: 70
Discharge: HOME | End: 2025-08-21
Attending: INTERNAL MEDICINE
Payer: MEDICARE

## 2025-08-21 ENCOUNTER — HOSPITAL ENCOUNTER (OUTPATIENT)
Dept: CT IMAGING | Age: 70
Discharge: HOME | End: 2025-08-21
Attending: INTERNAL MEDICINE
Payer: MEDICARE

## 2025-08-21 DIAGNOSIS — J44.9 CHRONIC OBSTRUCTIVE PULMONARY DISEASE, UNSPECIFIED COPD TYPE (HCC): ICD-10-CM

## 2025-08-21 DIAGNOSIS — R91.1 LUNG NODULE: ICD-10-CM

## 2025-08-21 DIAGNOSIS — F17.200 SMOKER UNMOTIVATED TO QUIT: ICD-10-CM

## 2025-08-21 PROCEDURE — 71250 CT THORAX DX C-: CPT

## 2025-08-21 PROCEDURE — 94060 EVALUATION OF WHEEZING: CPT

## 2025-09-02 ENCOUNTER — OFFICE VISIT (OUTPATIENT)
Dept: PULMONOLOGY | Age: 70
End: 2025-09-02
Payer: MEDICARE

## 2025-09-02 VITALS
WEIGHT: 139.8 LBS | BODY MASS INDEX: 26.39 KG/M2 | TEMPERATURE: 98 F | OXYGEN SATURATION: 95 % | DIASTOLIC BLOOD PRESSURE: 68 MMHG | HEIGHT: 61 IN | SYSTOLIC BLOOD PRESSURE: 114 MMHG | HEART RATE: 98 BPM

## 2025-09-02 DIAGNOSIS — F17.210 CIGARETTE NICOTINE DEPENDENCE, UNCOMPLICATED: ICD-10-CM

## 2025-09-02 DIAGNOSIS — R91.1 NODULE OF LOWER LOBE OF LEFT LUNG: ICD-10-CM

## 2025-09-02 DIAGNOSIS — J44.9 COPD, MILD (HCC): Primary | ICD-10-CM

## 2025-09-02 PROCEDURE — G8417 CALC BMI ABV UP PARAM F/U: HCPCS

## 2025-09-02 PROCEDURE — 3078F DIAST BP <80 MM HG: CPT

## 2025-09-02 PROCEDURE — 4004F PT TOBACCO SCREEN RCVD TLK: CPT

## 2025-09-02 PROCEDURE — 1160F RVW MEDS BY RX/DR IN RCRD: CPT

## 2025-09-02 PROCEDURE — G8427 DOCREV CUR MEDS BY ELIG CLIN: HCPCS

## 2025-09-02 PROCEDURE — G0296 VISIT TO DETERM LDCT ELIG: HCPCS

## 2025-09-02 PROCEDURE — 3023F SPIROM DOC REV: CPT

## 2025-09-02 PROCEDURE — 3017F COLORECTAL CA SCREEN DOC REV: CPT

## 2025-09-02 PROCEDURE — 1159F MED LIST DOCD IN RCRD: CPT

## 2025-09-02 PROCEDURE — G8399 PT W/DXA RESULTS DOCUMENT: HCPCS

## 2025-09-02 PROCEDURE — 99406 BEHAV CHNG SMOKING 3-10 MIN: CPT

## 2025-09-02 PROCEDURE — 99213 OFFICE O/P EST LOW 20 MIN: CPT

## 2025-09-02 PROCEDURE — 3074F SYST BP LT 130 MM HG: CPT

## 2025-09-02 PROCEDURE — 1123F ACP DISCUSS/DSCN MKR DOCD: CPT

## 2025-09-02 PROCEDURE — 1090F PRES/ABSN URINE INCON ASSESS: CPT

## 2025-09-02 RX ORDER — LACTOBACILLUS COMBINATION NO.4 3B CELL
CAPSULE ORAL
COMMUNITY

## 2025-09-02 RX ORDER — LEVETIRACETAM 750 MG/1
750 TABLET ORAL 2 TIMES DAILY
COMMUNITY
Start: 2025-08-15

## (undated) DEVICE — NEEDLE ASPIR 21GA L700MM US GUID TREAT DST END FOR EFFICIENT